# Patient Record
Sex: MALE | Race: WHITE | Employment: OTHER | URBAN - METROPOLITAN AREA
[De-identification: names, ages, dates, MRNs, and addresses within clinical notes are randomized per-mention and may not be internally consistent; named-entity substitution may affect disease eponyms.]

---

## 2017-03-06 ENCOUNTER — HOSPITAL ENCOUNTER (EMERGENCY)
Facility: HOSPITAL | Age: 72
Discharge: HOME | End: 2017-03-06
Attending: EMERGENCY MEDICINE
Payer: MEDICARE

## 2017-03-06 ENCOUNTER — APPOINTMENT (OUTPATIENT)
Dept: GENERAL RADIOLOGY | Facility: HOSPITAL | Age: 72
End: 2017-03-06
Attending: EMERGENCY MEDICINE
Payer: MEDICARE

## 2017-03-06 VITALS
WEIGHT: 200 LBS | HEART RATE: 70 BPM | BODY MASS INDEX: 31.39 KG/M2 | RESPIRATION RATE: 16 BRPM | SYSTOLIC BLOOD PRESSURE: 131 MMHG | TEMPERATURE: 98 F | OXYGEN SATURATION: 97 % | DIASTOLIC BLOOD PRESSURE: 78 MMHG | HEIGHT: 67 IN

## 2017-03-06 DIAGNOSIS — M54.40 BACK PAIN OF LUMBAR REGION WITH SCIATICA: Primary | ICD-10-CM

## 2017-03-06 PROCEDURE — 99283 EMERGENCY DEPT VISIT LOW MDM: CPT

## 2017-03-06 PROCEDURE — 72110 X-RAY EXAM L-2 SPINE 4/>VWS: CPT

## 2017-03-06 RX ORDER — TRAMADOL HYDROCHLORIDE 50 MG/1
50 TABLET ORAL ONCE
Status: COMPLETED | OUTPATIENT
Start: 2017-03-06 | End: 2017-03-06

## 2017-03-06 RX ORDER — TRAMADOL HYDROCHLORIDE 50 MG/1
50 TABLET ORAL EVERY 6 HOURS PRN
Qty: 20 TABLET | Refills: 0 | Status: SHIPPED | OUTPATIENT
Start: 2017-03-06 | End: 2017-03-13

## 2017-03-06 NOTE — ED PROVIDER NOTES
Patient Seen in: [facility] Emergency Department    History   Patient presents with:  Pain (neurologic)    Stated Complaint: right hip pain    HPI    Patient is a pleasant 27-year-old male, presenting for evaluation of right lower back pain.   Discom Family History   Problem Relation Age of Onset   • Heart Disorder Mother      CAD, Valve replacement         Smoking Status: Never Smoker                      Smokeless Status: Never Used                        Alcohol Use: Yes                Comment: shan intact and equal bilaterally. Antalgic gait. BACK:  No CVA or vertebral tenderness. Decreased range of motion.     ED Course     Labs Reviewed - No data to display  Xr Lumbar Spine (min 4 Views) (cpt=72110)    3/6/2017  PROCEDURE:  XR LUMBAR SPINE (MIN 4 reevaluation of any problems, worsening symptoms, or as discussed. Patient and family verbalized understanding and are comfortable with the plan as recommended. Patient was subsequently discharged without incident.     Disposition and Plan     [facility]

## 2017-03-06 NOTE — ED INITIAL ASSESSMENT (HPI)
Pt complaining of right hip pain that radiates down to right knee that started Saturday. Denies any previous injury or fall. Pain worst with ambulation. Relieved by lying flat. Today right leg gave out. unable to stand up for an hour, So pt called 911.  Rec

## 2017-03-17 PROBLEM — S39.012A LUMBAR STRAIN, INITIAL ENCOUNTER: Status: ACTIVE | Noted: 2017-03-17

## 2017-10-18 PROCEDURE — 81003 URINALYSIS AUTO W/O SCOPE: CPT | Performed by: FAMILY MEDICINE

## 2018-07-27 RX ORDER — DIPHENOXYLATE HYDROCHLORIDE AND ATROPINE SULFATE 2.5; .025 MG/1; MG/1
1 TABLET ORAL
COMMUNITY
End: 2018-07-27 | Stop reason: ALTCHOICE

## 2018-07-27 RX ORDER — FEXOFENADINE HCL 180 MG/1
180 TABLET ORAL
COMMUNITY
End: 2019-11-05

## 2018-07-27 RX ORDER — MULTIVITAMIN
1 TABLET ORAL DAILY
COMMUNITY

## 2018-07-27 RX ORDER — BENAZEPRIL HYDROCHLORIDE 20 MG/1
20 TABLET ORAL DAILY
COMMUNITY
End: 2018-12-19

## 2018-07-30 ENCOUNTER — HOSPITAL ENCOUNTER (OUTPATIENT)
Dept: INTERVENTIONAL RADIOLOGY/VASCULAR | Facility: HOSPITAL | Age: 73
Discharge: HOME | End: 2018-07-30
Attending: INTERNAL MEDICINE | Admitting: INTERNAL MEDICINE
Payer: MEDICARE

## 2018-07-30 VITALS
TEMPERATURE: 98 F | HEART RATE: 68 BPM | OXYGEN SATURATION: 100 % | SYSTOLIC BLOOD PRESSURE: 148 MMHG | BODY MASS INDEX: 30.31 KG/M2 | WEIGHT: 200 LBS | RESPIRATION RATE: 14 BRPM | DIASTOLIC BLOOD PRESSURE: 76 MMHG | HEIGHT: 68 IN

## 2018-07-30 DIAGNOSIS — R07.9 CHEST PAIN: ICD-10-CM

## 2018-07-30 PROCEDURE — B2111ZZ FLUOROSCOPY OF MULTIPLE CORONARY ARTERIES USING LOW OSMOLAR CONTRAST: ICD-10-PCS | Performed by: INTERNAL MEDICINE

## 2018-07-30 PROCEDURE — 99153 MOD SED SAME PHYS/QHP EA: CPT

## 2018-07-30 PROCEDURE — 99152 MOD SED SAME PHYS/QHP 5/>YRS: CPT

## 2018-07-30 PROCEDURE — 93454 CORONARY ARTERY ANGIO S&I: CPT

## 2018-07-30 RX ORDER — ASPIRIN 81 MG/1
324 TABLET, CHEWABLE ORAL ONCE
Status: DISCONTINUED | OUTPATIENT
Start: 2018-07-30 | End: 2018-07-30 | Stop reason: HOSPADM

## 2018-07-30 RX ORDER — LIDOCAINE HYDROCHLORIDE 10 MG/ML
INJECTION, SOLUTION EPIDURAL; INFILTRATION; INTRACAUDAL; PERINEURAL
Status: COMPLETED
Start: 2018-07-30 | End: 2018-07-30

## 2018-07-30 RX ORDER — MIDAZOLAM HYDROCHLORIDE 1 MG/ML
INJECTION INTRAMUSCULAR; INTRAVENOUS
Status: COMPLETED
Start: 2018-07-30 | End: 2018-07-30

## 2018-07-30 RX ORDER — HEPARIN SODIUM 5000 [USP'U]/ML
INJECTION, SOLUTION INTRAVENOUS; SUBCUTANEOUS
Status: COMPLETED
Start: 2018-07-30 | End: 2018-07-30

## 2018-07-30 RX ORDER — SODIUM CHLORIDE 9 MG/ML
INJECTION, SOLUTION INTRAVENOUS CONTINUOUS
Status: DISCONTINUED | OUTPATIENT
Start: 2018-07-30 | End: 2018-07-30

## 2018-07-30 RX ORDER — CLOPIDOGREL BISULFATE 75 MG/1
75 TABLET ORAL ONCE
Status: COMPLETED | OUTPATIENT
Start: 2018-07-30 | End: 2018-07-30

## 2018-07-30 RX ORDER — CLOPIDOGREL BISULFATE 75 MG/1
TABLET ORAL
Status: COMPLETED
Start: 2018-07-30 | End: 2018-07-30

## 2018-07-30 RX ORDER — CLOPIDOGREL BISULFATE 75 MG/1
75 TABLET ORAL DAILY
Qty: 30 TABLET | Refills: 5 | Status: SHIPPED | OUTPATIENT
Start: 2018-07-30 | End: 2018-08-13

## 2018-07-30 RX ADMIN — CLOPIDOGREL BISULFATE 75 MG: 75 TABLET ORAL at 17:45:00

## 2018-07-30 RX ADMIN — SODIUM CHLORIDE 500 ML: 9 INJECTION, SOLUTION INTRAVENOUS at 15:40:00

## 2018-07-30 NOTE — H&P
Seen in holding. cardiolite was normal, but still with exertional concerns. Normal cardiolite prior to previous PCI. Cath is indicated. No change from office note.

## 2018-07-30 NOTE — PROGRESS NOTES
Assuming care of patient at 1600, s/p C, right groin soft c/d/i, patient denies pain 0/10 on pain scale. Patient tolerating PO intake. Bedrest completed, voiding without complaints, ambulating in halls without complaints.  Dr. Neetu Dodd here at bedside speakin

## 2018-07-31 NOTE — PROCEDURES
[facility]    PATIENT'S NAME: Evgeny WETZEL   ATTENDING PHYSICIAN: Carmen Mohamud M.D. OPERATING PHYSICIAN: Carmen Mohamud M.D.    PATIENT ACCOUNT#:   [de-identified]    LOCATION:  [place] [address] ED  MEDICAL RECORD #:   [id number] in-stent restenoses. There is diffuse disease within the right coronary artery as well as diffuse calcification. The disease is nonobstructive, however.   There is a 50% to 60% stenosis distally before the takeoff of the posterior descending coronary nav

## 2018-08-02 ENCOUNTER — HOSPITAL ENCOUNTER (OUTPATIENT)
Dept: ULTRASOUND IMAGING | Facility: HOSPITAL | Age: 73
Discharge: HOME | End: 2018-08-02
Attending: NURSE PRACTITIONER
Payer: MEDICARE

## 2018-08-02 DIAGNOSIS — T14.8XXA HEMATOMA: ICD-10-CM

## 2018-08-02 DIAGNOSIS — Z95.820 S/P ANGIOPLASTY WITH STENT: ICD-10-CM

## 2018-08-02 PROCEDURE — 93926 LOWER EXTREMITY STUDY: CPT | Performed by: NURSE PRACTITIONER

## 2018-08-02 NOTE — H&P
Seen 6/26/18 in clinic. As below. Has been hiking and active around the house. He is back because he has exertional chest pain only after eating. Stable over the past 6 months.     Patient denies palpitations, light headedness, edema, or dyspnea out of

## 2018-08-02 NOTE — PROGRESS NOTES
No active bleeding, pseudoaneurysm or DVT noted. Discussed results with patient. Given instructions to go to ER should the hematoma change in size or active bleeding noted. He verbalized understanding.

## 2018-08-07 ENCOUNTER — HOSPITAL ENCOUNTER (OUTPATIENT)
Dept: INTERVENTIONAL RADIOLOGY/VASCULAR | Facility: HOSPITAL | Age: 73
Discharge: HOME | End: 2018-08-08
Attending: INTERNAL MEDICINE | Admitting: INTERNAL MEDICINE
Payer: MEDICARE

## 2018-08-07 DIAGNOSIS — I25.10 CAD (CORONARY ARTERY DISEASE): ICD-10-CM

## 2018-08-07 PROCEDURE — 93010 ELECTROCARDIOGRAM REPORT: CPT | Performed by: INTERNAL MEDICINE

## 2018-08-07 PROCEDURE — 027034Z DILATION OF CORONARY ARTERY, ONE ARTERY WITH DRUG-ELUTING INTRALUMINAL DEVICE, PERCUTANEOUS APPROACH: ICD-10-PCS | Performed by: INTERNAL MEDICINE

## 2018-08-07 PROCEDURE — 85347 COAGULATION TIME ACTIVATED: CPT

## 2018-08-07 PROCEDURE — 99153 MOD SED SAME PHYS/QHP EA: CPT

## 2018-08-07 PROCEDURE — 02C03ZZ EXTIRPATION OF MATTER FROM CORONARY ARTERY, ONE ARTERY, PERCUTANEOUS APPROACH: ICD-10-PCS | Performed by: INTERNAL MEDICINE

## 2018-08-07 PROCEDURE — 99152 MOD SED SAME PHYS/QHP 5/>YRS: CPT

## 2018-08-07 PROCEDURE — 93005 ELECTROCARDIOGRAM TRACING: CPT

## 2018-08-07 RX ORDER — SODIUM CHLORIDE 9 MG/ML
INJECTION, SOLUTION INTRAVENOUS CONTINUOUS
Status: ACTIVE | OUTPATIENT
Start: 2018-08-07 | End: 2018-08-07

## 2018-08-07 RX ORDER — CLOPIDOGREL BISULFATE 75 MG/1
75 TABLET ORAL DAILY
Status: DISCONTINUED | OUTPATIENT
Start: 2018-08-08 | End: 2018-08-08

## 2018-08-07 RX ORDER — HEPARIN SODIUM 5000 [USP'U]/ML
INJECTION, SOLUTION INTRAVENOUS; SUBCUTANEOUS
Status: COMPLETED
Start: 2018-08-07 | End: 2018-08-07

## 2018-08-07 RX ORDER — MIDAZOLAM HYDROCHLORIDE 1 MG/ML
INJECTION INTRAMUSCULAR; INTRAVENOUS
Status: COMPLETED
Start: 2018-08-07 | End: 2018-08-07

## 2018-08-07 RX ORDER — ASPIRIN 81 MG/1
324 TABLET, CHEWABLE ORAL ONCE
Status: DISCONTINUED | OUTPATIENT
Start: 2018-08-07 | End: 2018-08-07

## 2018-08-07 RX ORDER — LIDOCAINE HYDROCHLORIDE 10 MG/ML
INJECTION, SOLUTION EPIDURAL; INFILTRATION; INTRACAUDAL; PERINEURAL
Status: COMPLETED
Start: 2018-08-07 | End: 2018-08-07

## 2018-08-07 RX ORDER — SODIUM CHLORIDE 9 MG/ML
INJECTION, SOLUTION INTRAVENOUS CONTINUOUS
Status: DISCONTINUED | OUTPATIENT
Start: 2018-08-07 | End: 2018-08-08

## 2018-08-07 RX ADMIN — SODIUM CHLORIDE: 9 INJECTION, SOLUTION INTRAVENOUS at 12:30:00

## 2018-08-07 RX ADMIN — SODIUM CHLORIDE: 9 INJECTION, SOLUTION INTRAVENOUS at 18:00:00

## 2018-08-07 NOTE — H&P
Cards    See EMR. Diagnostic cath by Dr. Wellington Stephens. Symptoms are stable. Planned PCI, possible laser atherectomy.     Patient Active Problem List:     S/P angioplasty with stent     Dyslipidemia     HTN (hypertension)    Past Medical History:   Liss

## 2018-08-07 NOTE — PROGRESS NOTES
Cath:    PCI to CX: 7F. Microkit. Left femoral.  EBU 3.75 guide. BMW in CX. Prowater in LAD. 2.5mm balloon to predilate. Laser 0.9mm. Several passes at moderate energy. 2.5mm angiosculpt and then 3.0mm NC. Somewhat \"hazy\" at proximal CX.   Stente

## 2018-08-07 NOTE — PLAN OF CARE
Assumed care of patient around [id number]. Pt received from cath lab. A/o x 4, RA, NSR on tele monitor. Left groin incision soft, no hematoma. Dressing clean/dry/intact. Right groin bruising noted from previous angiogram. BR per protocol. IVF infusing per order.

## 2018-08-08 VITALS
HEART RATE: 110 BPM | WEIGHT: 200 LBS | TEMPERATURE: 98 F | RESPIRATION RATE: 18 BRPM | SYSTOLIC BLOOD PRESSURE: 148 MMHG | DIASTOLIC BLOOD PRESSURE: 85 MMHG | HEIGHT: 68 IN | OXYGEN SATURATION: 96 % | BODY MASS INDEX: 30.31 KG/M2

## 2018-08-08 LAB
ANION GAP SERPL CALC-SCNC: 9 MMOL/L (ref 0–18)
ATRIAL RATE: 65 BPM
BUN BLD-MCNC: 15 MG/DL (ref 8–20)
BUN/CREAT SERPL: 14 (ref 10–20)
CALCIUM BLD-MCNC: 8.9 MG/DL (ref 8.3–10.3)
CHLORIDE SERPL-SCNC: 105 MMOL/L (ref 101–111)
CO2 SERPL-SCNC: 26 MMOL/L (ref 22–32)
CREAT BLD-MCNC: 1.07 MG/DL (ref 0.7–1.3)
ERYTHROCYTE [DISTWIDTH] IN BLOOD BY AUTOMATED COUNT: 13.2 % (ref 11.5–16)
GLUCOSE BLD-MCNC: 91 MG/DL (ref 70–99)
HCT VFR BLD AUTO: 39.1 % (ref 37–53)
HGB BLD-MCNC: 13.7 G/DL (ref 13–17)
ISTAT ACTIVATED CLOTTING TIME: 367 SECONDS (ref 74–137)
MCH RBC QN AUTO: 32.5 PG (ref 27–33.2)
MCHC RBC AUTO-ENTMCNC: 35 G/DL (ref 31–37)
MCV RBC AUTO: 92.9 FL (ref 80–99)
OSMOLALITY SERPL CALC.SUM OF ELEC: 290 MOSM/KG (ref 275–295)
P AXIS: 59 DEGREES
P-R INTERVAL: 144 MS
PLATELET # BLD AUTO: 147 10(3)UL (ref 150–450)
POTASSIUM SERPL-SCNC: 4.1 MMOL/L (ref 3.6–5.1)
Q-T INTERVAL: 442 MS
QRS DURATION: 126 MS
QTC CALCULATION (BEZET): 459 MS
R AXIS: 30 DEGREES
RBC # BLD AUTO: 4.21 X10(6)UL (ref 3.8–5.8)
RED CELL DISTRIBUTION WIDTH-SD: 44.6 FL (ref 35.1–46.3)
SODIUM SERPL-SCNC: 140 MMOL/L (ref 136–144)
T AXIS: 7 DEGREES
VENTRICULAR RATE: 65 BPM
WBC # BLD AUTO: 7.2 X10(3) UL (ref 4–13)

## 2018-08-08 PROCEDURE — 85027 COMPLETE CBC AUTOMATED: CPT | Performed by: INTERNAL MEDICINE

## 2018-08-08 PROCEDURE — 80048 BASIC METABOLIC PNL TOTAL CA: CPT | Performed by: INTERNAL MEDICINE

## 2018-08-08 RX ADMIN — CLOPIDOGREL BISULFATE 75 MG: 75 TABLET ORAL at 08:19:00

## 2018-08-08 NOTE — CARDIAC REHAB
Cardiac rehab education for CAD/Stent completed with pt and spouse. Stent card and copy given to patient. Phase 2 information provided, but pt declined at this time.

## 2018-08-08 NOTE — PLAN OF CARE
Patient is alert and oriented. On room air, NSR on the tele. Right groin is bruised from previous cath. Left groin is clean, dry and intact. Soft to the touch. Denies any pain or SOB. Patient ambulates ad mag. Plan for patient to be discharged today.  POC up

## 2018-08-08 NOTE — PLAN OF CARE
Patient is able to discharge. IV was discontinued, and tele was taken off and returned. Discharge papers explained. All questions answered. Patient and wife verbalized understanding. Patient was escorted via wheel chair to the lobby.

## 2018-08-08 NOTE — PROCEDURES
[address]    PATIENT'S NAME: Tarik Berto.OSMAR   ATTENDING PHYSICIAN: Sheryl Cote. Devora Franklin MD   OPERATING PHYSICIAN: Sheryl Cote.  Devora Franklin MD   PATIENT ACCOUNT#:   [id number]    LOCATION:  [address] [place] [address]  MEDICAL RECORD #:   [id number]       DATE OF BIRTH: from [phone] until [phone] under my direct supervision. CONCLUSIONS:  A 82-year-old gentleman with CAD and 90% circumflex lesion. This was \"in segment\" restenosis. We performed laser atherectomy and stenting. We had an excellent result.   We will observe

## 2018-08-08 NOTE — PLAN OF CARE
CARDIOVASCULAR - ADULT    • Maintains optimal cardiac output and hemodynamic stability Progressing    • Absence of cardiac arrhythmias or at baseline Progressing        SKIN/TISSUE INTEGRITY - ADULT    • Skin integrity remains intact Progressing          R

## 2018-08-08 NOTE — DIETARY NOTE
Nutrition Short Note    Dietitian consult received per cardiac rehab/CHF protocol. Pt to be educated by cardiac rehab staff and encouraged to attend outpatient classes taught by [employer]. [employer] available PRN.     Sushma Castellanos RD, LDN  Pager #[phone]

## 2018-08-08 NOTE — PROGRESS NOTES
Cards    FU: CAD, PCI    No CP. No LINDO. Ambulating. No groin issues.     Past Medical History:   Diagnosis Date   • CAD (coronary artery disease)    • Coronary atherosclerosis    • High blood pressure    • HIGH CHOLESTEROL    • HYPERTENSION

## 2019-01-11 ENCOUNTER — HOSPITAL ENCOUNTER (OUTPATIENT)
Dept: INTERVENTIONAL RADIOLOGY/VASCULAR | Facility: HOSPITAL | Age: 74
Discharge: HOME | End: 2019-01-12
Attending: INTERNAL MEDICINE | Admitting: INTERNAL MEDICINE
Payer: MEDICARE

## 2019-01-11 DIAGNOSIS — I25.10 CAD (CORONARY ARTERY DISEASE): ICD-10-CM

## 2019-01-11 DIAGNOSIS — I20.0 UNSTABLE ANGINA (HCC): ICD-10-CM

## 2019-01-11 LAB
ATRIAL RATE: 71 BPM
ATRIAL RATE: 72 BPM
ERYTHROCYTE [DISTWIDTH] IN BLOOD BY AUTOMATED COUNT: 13.2 % (ref 11.5–16)
HCT VFR BLD AUTO: 46.4 % (ref 37–53)
HGB BLD-MCNC: 16.2 G/DL (ref 13–17)
MCH RBC QN AUTO: 32.5 PG (ref 27–33.2)
MCHC RBC AUTO-ENTMCNC: 34.9 G/DL (ref 31–37)
MCV RBC AUTO: 93 FL (ref 80–99)
P AXIS: 54 DEGREES
P AXIS: 56 DEGREES
P-R INTERVAL: 138 MS
P-R INTERVAL: 140 MS
PLATELET # BLD AUTO: 143 10(3)UL (ref 150–450)
Q-T INTERVAL: 418 MS
Q-T INTERVAL: 422 MS
QRS DURATION: 126 MS
QRS DURATION: 128 MS
QTC CALCULATION (BEZET): 457 MS
QTC CALCULATION (BEZET): 458 MS
R AXIS: 29 DEGREES
R AXIS: 38 DEGREES
RBC # BLD AUTO: 4.99 X10(6)UL (ref 3.8–5.8)
RED CELL DISTRIBUTION WIDTH-SD: 45 FL (ref 35.1–46.3)
T AXIS: 15 DEGREES
T AXIS: 18 DEGREES
VENTRICULAR RATE: 71 BPM
VENTRICULAR RATE: 72 BPM
WBC # BLD AUTO: 5.6 X10(3) UL (ref 4–13)

## 2019-01-11 PROCEDURE — 93454 CORONARY ARTERY ANGIO S&I: CPT

## 2019-01-11 PROCEDURE — 93010 ELECTROCARDIOGRAM REPORT: CPT | Performed by: INTERNAL MEDICINE

## 2019-01-11 PROCEDURE — 99153 MOD SED SAME PHYS/QHP EA: CPT

## 2019-01-11 PROCEDURE — 99152 MOD SED SAME PHYS/QHP 5/>YRS: CPT

## 2019-01-11 PROCEDURE — 93005 ELECTROCARDIOGRAM TRACING: CPT

## 2019-01-11 PROCEDURE — 92920 PRQ TRLUML C ANGIOP 1ART&/BR: CPT

## 2019-01-11 PROCEDURE — 85027 COMPLETE CBC AUTOMATED: CPT | Performed by: INTERNAL MEDICINE

## 2019-01-11 PROCEDURE — 02703ZZ DILATION OF CORONARY ARTERY, ONE ARTERY, PERCUTANEOUS APPROACH: ICD-10-PCS | Performed by: INTERNAL MEDICINE

## 2019-01-11 PROCEDURE — 36415 COLL VENOUS BLD VENIPUNCTURE: CPT

## 2019-01-11 PROCEDURE — B2111ZZ FLUOROSCOPY OF MULTIPLE CORONARY ARTERIES USING LOW OSMOLAR CONTRAST: ICD-10-PCS | Performed by: INTERNAL MEDICINE

## 2019-01-11 PROCEDURE — 3E033PZ INTRODUCTION OF PLATELET INHIBITOR INTO PERIPHERAL VEIN, PERCUTANEOUS APPROACH: ICD-10-PCS | Performed by: INTERNAL MEDICINE

## 2019-01-11 RX ORDER — SODIUM CHLORIDE 9 MG/ML
INJECTION, SOLUTION INTRAVENOUS CONTINUOUS
Status: DISCONTINUED | OUTPATIENT
Start: 2019-01-11 | End: 2019-01-12

## 2019-01-11 RX ORDER — ACETAMINOPHEN 325 MG/1
325 TABLET ORAL EVERY 4 HOURS PRN
Status: DISCONTINUED | OUTPATIENT
Start: 2019-01-11 | End: 2019-01-12

## 2019-01-11 RX ORDER — ASPIRIN 81 MG/1
324 TABLET, CHEWABLE ORAL ONCE
Status: DISCONTINUED | OUTPATIENT
Start: 2019-01-11 | End: 2019-01-11 | Stop reason: HOSPADM

## 2019-01-11 RX ORDER — MIDAZOLAM HYDROCHLORIDE 1 MG/ML
INJECTION INTRAMUSCULAR; INTRAVENOUS
Status: COMPLETED
Start: 2019-01-11 | End: 2019-01-11

## 2019-01-11 RX ORDER — LISINOPRIL 20 MG/1
20 TABLET ORAL DAILY
Status: DISCONTINUED | OUTPATIENT
Start: 2019-01-12 | End: 2019-01-12

## 2019-01-11 RX ORDER — DOXEPIN HYDROCHLORIDE 50 MG/1
1 CAPSULE ORAL DAILY
Status: DISCONTINUED | OUTPATIENT
Start: 2019-01-11 | End: 2019-01-12

## 2019-01-11 RX ORDER — CLOPIDOGREL BISULFATE 75 MG/1
75 TABLET ORAL DAILY
Status: DISCONTINUED | OUTPATIENT
Start: 2019-01-12 | End: 2019-01-11

## 2019-01-11 RX ORDER — CETIRIZINE HYDROCHLORIDE 10 MG/1
10 TABLET ORAL DAILY
Status: DISCONTINUED | OUTPATIENT
Start: 2019-01-12 | End: 2019-01-12

## 2019-01-11 RX ORDER — ASPIRIN 81 MG/1
81 TABLET ORAL NIGHTLY
Status: DISCONTINUED | OUTPATIENT
Start: 2019-01-11 | End: 2019-01-12

## 2019-01-11 RX ORDER — SODIUM CHLORIDE 9 MG/ML
INJECTION, SOLUTION INTRAVENOUS CONTINUOUS
Status: ACTIVE | OUTPATIENT
Start: 2019-01-11 | End: 2019-01-12

## 2019-01-11 RX ORDER — ACETAMINOPHEN 325 MG/1
650 TABLET ORAL EVERY 4 HOURS PRN
Status: DISCONTINUED | OUTPATIENT
Start: 2019-01-11 | End: 2019-01-12

## 2019-01-11 RX ORDER — MIDAZOLAM HYDROCHLORIDE 1 MG/ML
INJECTION INTRAMUSCULAR; INTRAVENOUS
Status: DISCONTINUED
Start: 2019-01-11 | End: 2019-01-11 | Stop reason: WASHOUT

## 2019-01-11 RX ORDER — CLOPIDOGREL BISULFATE 75 MG/1
75 TABLET ORAL DAILY
Status: DISCONTINUED | OUTPATIENT
Start: 2019-01-12 | End: 2019-01-12

## 2019-01-11 RX ADMIN — SODIUM CHLORIDE: 9 INJECTION, SOLUTION INTRAVENOUS at 18:57:00

## 2019-01-11 RX ADMIN — SODIUM CHLORIDE: 9 INJECTION, SOLUTION INTRAVENOUS at 17:25:00

## 2019-01-11 RX ADMIN — ASPIRIN 81 MG: 81 TABLET ORAL at 20:32:00

## 2019-01-11 RX ADMIN — ACETAMINOPHEN 650 MG: 325 TABLET ORAL at 14:00:00

## 2019-01-11 RX ADMIN — SODIUM CHLORIDE: 9 INJECTION, SOLUTION INTRAVENOUS at 07:30:00

## 2019-01-11 NOTE — PROCEDURES
[facility] Cardiology      Procedure:   CORONARY ANGIO, PTCA PROX LCX                         PERCLOSE RFA      Findings        LM: PLAQUE    LCx: 99% PROX, IN-STENT    LAD: PLAQUE    RCA: 50-60% MID    RESULTS: PTCA PROX LCX, 99% TO 15% WITH 3.5 X 12 MM NC BAL

## 2019-01-11 NOTE — PROCEDURES
[address]    PATIENT'S NAME: Ruma Signs E   ATTENDING PHYSICIAN: Surendra Cristina M.D. OPERATING PHYSICIAN: Surendra Cristina M.D.    PATIENT ACCOUNT#:   [de-identified]    LOCATION:  [address] [place] [address]  MEDICAL RECORD #:   [id number] septal  branch. In the mid LAD, just beyond the large septal  branch, the LAD itself is seen to have narrowing of about 50%. The right coronary artery is a medium-sized anatomically dominant vessel.   It gives rise to a very small PDA, Charley Wood M.D.  d: 01/11/2019 12:22:39  t: 01/11/2019 12:32:27  [facility] [id number]  Z/

## 2019-01-11 NOTE — PROGRESS NOTES
Pt here for angiogram. Labs and EKG were done yesterday at [facility] office. Only BMP was found. Per Dr Ayesha Brizuela, he does not have results and tests need to be repeated.

## 2019-01-11 NOTE — DIETARY NOTE
Nutrition Short Note    Dietitian consult received per cardiac rehab/CHF protocol. Pt to be educated by cardiac rehab staff and encouraged to attend outpatient classes taught by RD. RD available PRN.     Scott Martinez RD, LDN  Pager #[phone]

## 2019-01-12 VITALS
DIASTOLIC BLOOD PRESSURE: 86 MMHG | TEMPERATURE: 99 F | HEART RATE: 93 BPM | OXYGEN SATURATION: 97 % | BODY MASS INDEX: 31.39 KG/M2 | HEIGHT: 67 IN | SYSTOLIC BLOOD PRESSURE: 132 MMHG | RESPIRATION RATE: 18 BRPM | WEIGHT: 200 LBS

## 2019-01-12 LAB
ANION GAP SERPL CALC-SCNC: 12 MMOL/L (ref 0–18)
BASOPHILS # BLD AUTO: 0.03 X10(3) UL (ref 0–0.1)
BASOPHILS NFR BLD AUTO: 0.4 %
BUN BLD-MCNC: 17 MG/DL (ref 8–20)
BUN/CREAT SERPL: 16.5 (ref 10–20)
CALCIUM BLD-MCNC: 8.9 MG/DL (ref 8.3–10.3)
CHLORIDE SERPL-SCNC: 107 MMOL/L (ref 101–111)
CO2 SERPL-SCNC: 21 MMOL/L (ref 22–32)
CREAT BLD-MCNC: 1.03 MG/DL (ref 0.7–1.3)
EOSINOPHIL # BLD AUTO: 0.12 X10(3) UL (ref 0–0.3)
EOSINOPHIL NFR BLD AUTO: 1.6 %
ERYTHROCYTE [DISTWIDTH] IN BLOOD BY AUTOMATED COUNT: 13 % (ref 11.5–16)
GLUCOSE BLD-MCNC: 92 MG/DL (ref 70–99)
HCT VFR BLD AUTO: 43.3 % (ref 37–53)
HGB BLD-MCNC: 15 G/DL (ref 13–17)
IMMATURE GRANULOCYTE COUNT: 0.02 X10(3) UL (ref 0–1)
IMMATURE GRANULOCYTE RATIO %: 0.3 %
LYMPHOCYTES # BLD AUTO: 1.43 X10(3) UL (ref 0.9–4)
LYMPHOCYTES NFR BLD AUTO: 18.5 %
MCH RBC QN AUTO: 32.3 PG (ref 27–33.2)
MCHC RBC AUTO-ENTMCNC: 34.6 G/DL (ref 31–37)
MCV RBC AUTO: 93.1 FL (ref 80–99)
MONOCYTES # BLD AUTO: 0.62 X10(3) UL (ref 0.1–1)
MONOCYTES NFR BLD AUTO: 8 %
NEUTROPHIL ABS PRELIM: 5.5 X10 (3) UL (ref 1.3–6.7)
NEUTROPHILS # BLD AUTO: 5.5 X10(3) UL (ref 1.3–6.7)
NEUTROPHILS NFR BLD AUTO: 71.2 %
OSMOLALITY SERPL CALC.SUM OF ELEC: 291 MOSM/KG (ref 275–295)
PLATELET # BLD AUTO: 134 10(3)UL (ref 150–450)
POTASSIUM SERPL-SCNC: 4.2 MMOL/L (ref 3.6–5.1)
RBC # BLD AUTO: 4.65 X10(6)UL (ref 3.8–5.8)
RED CELL DISTRIBUTION WIDTH-SD: 44.4 FL (ref 35.1–46.3)
SODIUM SERPL-SCNC: 140 MMOL/L (ref 136–144)
WBC # BLD AUTO: 7.7 X10(3) UL (ref 4–13)

## 2019-01-12 PROCEDURE — 80048 BASIC METABOLIC PNL TOTAL CA: CPT | Performed by: INTERNAL MEDICINE

## 2019-01-12 PROCEDURE — 85025 COMPLETE CBC W/AUTO DIFF WBC: CPT | Performed by: INTERNAL MEDICINE

## 2019-01-12 RX ORDER — ROSUVASTATIN CALCIUM 5 MG/1
5 TABLET, COATED ORAL ONCE
Qty: 90 TABLET | Refills: 3 | Status: SHIPPED | OUTPATIENT
Start: 2019-01-12 | End: 2019-01-12

## 2019-01-12 RX ORDER — ROSUVASTATIN CALCIUM 5 MG/1
5 TABLET, COATED ORAL ONCE
Status: COMPLETED | OUTPATIENT
Start: 2019-01-12 | End: 2019-01-12

## 2019-01-12 RX ADMIN — ROSUVASTATIN CALCIUM 5 MG: 5 TABLET, COATED ORAL at 09:08:00

## 2019-01-12 RX ADMIN — CLOPIDOGREL BISULFATE 75 MG: 75 TABLET ORAL at 08:55:00

## 2019-01-12 NOTE — PROGRESS NOTES
Discussed importance of cardiac rehab education and physically going to class. Pt. Refuses. He states \"I have a stationary bike at home and I'd rather do walks around the neighborhood\".

## 2019-01-12 NOTE — PROGRESS NOTES
Assumed pt care at 0200. AOx4, no complaints of chest pain or SOB, right groin stable, dressing C/D/I, no signs of oozing or hematoma, VSS, NSR on tele, safety precautions in place; bed in lowest position, call light within reach, ant-slip socks placed.  Wi

## 2019-01-12 NOTE — PROGRESS NOTES
[facility] Cardiology Progress Note        Andrew Bergman.  Patient Status:  Outpatient in a Bed    1945 MRN [id number]   [facility] 8NE-A Attending Zaid Newer Day # 0 PCP KAILEE Mustafa hours.    Invalid input(s): ALPHOS, TBIL, DBIL, TPROT    No results for input(s): TROP, CK in the last 168 hours. No results for input(s): PTP, INR in the last 168 hours. Impression:  1.  CAD- S/P angioplasty and stent of proximal left circ

## 2019-01-12 NOTE — PLAN OF CARE
Denies c/o pain, malaise, or cardiac symptoms. Often in ST on telemetry sometimes as high as 120s. Ambulating the halls well with no concerns for his right groin. Old drainage, but otherwise free from hematoma, bleeding, or oozing. Lungs clear on RA.

## 2019-02-18 PROCEDURE — 81003 URINALYSIS AUTO W/O SCOPE: CPT | Performed by: FAMILY MEDICINE

## 2019-04-21 ENCOUNTER — APPOINTMENT (OUTPATIENT)
Dept: GENERAL RADIOLOGY | Facility: HOSPITAL | Age: 74
DRG: 234 | End: 2019-04-21
Payer: MEDICARE

## 2019-04-21 ENCOUNTER — HOSPITAL ENCOUNTER (INPATIENT)
Facility: HOSPITAL | Age: 74
LOS: 8 days | Discharge: HOME HEALTH | DRG: 234 | End: 2019-04-30
Admitting: INTERNAL MEDICINE
Payer: MEDICARE

## 2019-04-21 DIAGNOSIS — R07.9 ACUTE CHEST PAIN: Primary | ICD-10-CM

## 2019-04-21 PROCEDURE — 80053 COMPREHEN METABOLIC PANEL: CPT

## 2019-04-21 PROCEDURE — 93005 ELECTROCARDIOGRAM TRACING: CPT

## 2019-04-21 PROCEDURE — 84484 ASSAY OF TROPONIN QUANT: CPT

## 2019-04-21 PROCEDURE — 96374 THER/PROPH/DIAG INJ IV PUSH: CPT

## 2019-04-21 PROCEDURE — 71045 X-RAY EXAM CHEST 1 VIEW: CPT

## 2019-04-21 PROCEDURE — 85378 FIBRIN DEGRADE SEMIQUANT: CPT

## 2019-04-21 PROCEDURE — S0028 INJECTION, FAMOTIDINE, 20 MG: HCPCS

## 2019-04-21 PROCEDURE — 93010 ELECTROCARDIOGRAM REPORT: CPT

## 2019-04-21 PROCEDURE — 99285 EMERGENCY DEPT VISIT HI MDM: CPT

## 2019-04-21 PROCEDURE — 83690 ASSAY OF LIPASE: CPT

## 2019-04-21 PROCEDURE — 85730 THROMBOPLASTIN TIME PARTIAL: CPT

## 2019-04-21 PROCEDURE — 84484 ASSAY OF TROPONIN QUANT: CPT | Performed by: INTERNAL MEDICINE

## 2019-04-21 PROCEDURE — 85610 PROTHROMBIN TIME: CPT

## 2019-04-21 PROCEDURE — 93010 ELECTROCARDIOGRAM REPORT: CPT | Performed by: INTERNAL MEDICINE

## 2019-04-21 PROCEDURE — 85025 COMPLETE CBC W/AUTO DIFF WBC: CPT

## 2019-04-21 PROCEDURE — 96375 TX/PRO/DX INJ NEW DRUG ADDON: CPT

## 2019-04-21 RX ORDER — METOPROLOL TARTRATE 5 MG/5ML
5 INJECTION INTRAVENOUS ONCE
Status: COMPLETED | OUTPATIENT
Start: 2019-04-21 | End: 2019-04-21

## 2019-04-21 RX ORDER — MORPHINE SULFATE 4 MG/ML
2 INJECTION, SOLUTION INTRAMUSCULAR; INTRAVENOUS EVERY 2 HOUR PRN
Status: DISCONTINUED | OUTPATIENT
Start: 2019-04-21 | End: 2019-04-25

## 2019-04-21 RX ORDER — ONDANSETRON 2 MG/ML
4 INJECTION INTRAMUSCULAR; INTRAVENOUS EVERY 6 HOURS PRN
Status: DISCONTINUED | OUTPATIENT
Start: 2019-04-21 | End: 2019-04-25

## 2019-04-21 RX ORDER — LISINOPRIL 20 MG/1
20 TABLET ORAL DAILY
Status: DISCONTINUED | OUTPATIENT
Start: 2019-04-21 | End: 2019-04-25

## 2019-04-21 RX ORDER — NITROGLYCERIN 20 MG/100ML
10 INJECTION INTRAVENOUS CONTINUOUS
Status: DISCONTINUED | OUTPATIENT
Start: 2019-04-21 | End: 2019-04-22

## 2019-04-21 RX ORDER — ASPIRIN 81 MG/1
324 TABLET, CHEWABLE ORAL ONCE
Status: COMPLETED | OUTPATIENT
Start: 2019-04-21 | End: 2019-04-21

## 2019-04-21 RX ORDER — LORAZEPAM 2 MG/ML
0.5 INJECTION INTRAMUSCULAR EVERY 4 HOURS PRN
Status: DISCONTINUED | OUTPATIENT
Start: 2019-04-21 | End: 2019-04-25

## 2019-04-21 RX ORDER — FAMOTIDINE 10 MG/ML
20 INJECTION, SOLUTION INTRAVENOUS ONCE
Status: COMPLETED | OUTPATIENT
Start: 2019-04-21 | End: 2019-04-21

## 2019-04-21 RX ORDER — NITROGLYCERIN 0.4 MG/1
0.4 TABLET SUBLINGUAL EVERY 5 MIN PRN
Status: DISCONTINUED | OUTPATIENT
Start: 2019-04-21 | End: 2019-04-25

## 2019-04-21 RX ORDER — SODIUM CHLORIDE 9 MG/ML
INJECTION, SOLUTION INTRAVENOUS CONTINUOUS
Status: DISCONTINUED | OUTPATIENT
Start: 2019-04-21 | End: 2019-04-21

## 2019-04-21 RX ORDER — ENOXAPARIN SODIUM 100 MG/ML
90 INJECTION SUBCUTANEOUS ONCE
Status: COMPLETED | OUTPATIENT
Start: 2019-04-21 | End: 2019-04-21

## 2019-04-21 RX ORDER — ROSUVASTATIN CALCIUM 10 MG/1
5 TABLET, COATED ORAL NIGHTLY
Status: DISCONTINUED | OUTPATIENT
Start: 2019-04-21 | End: 2019-04-21

## 2019-04-21 RX ORDER — METOCLOPRAMIDE HYDROCHLORIDE 5 MG/ML
10 INJECTION INTRAMUSCULAR; INTRAVENOUS EVERY 8 HOURS PRN
Status: DISCONTINUED | OUTPATIENT
Start: 2019-04-21 | End: 2019-04-25

## 2019-04-21 RX ORDER — CLOPIDOGREL BISULFATE 75 MG/1
75 TABLET ORAL DAILY
Status: DISCONTINUED | OUTPATIENT
Start: 2019-04-21 | End: 2019-04-22

## 2019-04-21 RX ORDER — MORPHINE SULFATE 4 MG/ML
2 INJECTION, SOLUTION INTRAMUSCULAR; INTRAVENOUS ONCE
Status: COMPLETED | OUTPATIENT
Start: 2019-04-21 | End: 2019-04-21

## 2019-04-21 RX ORDER — ACETAMINOPHEN 325 MG/1
650 TABLET ORAL EVERY 6 HOURS PRN
Status: DISCONTINUED | OUTPATIENT
Start: 2019-04-21 | End: 2019-04-25

## 2019-04-21 RX ORDER — ASPIRIN 81 MG/1
81 TABLET ORAL NIGHTLY
Status: DISCONTINUED | OUTPATIENT
Start: 2019-04-21 | End: 2019-04-30

## 2019-04-21 RX ORDER — MORPHINE SULFATE 4 MG/ML
1 INJECTION, SOLUTION INTRAMUSCULAR; INTRAVENOUS EVERY 2 HOUR PRN
Status: DISCONTINUED | OUTPATIENT
Start: 2019-04-21 | End: 2019-04-25

## 2019-04-21 RX ORDER — MORPHINE SULFATE 4 MG/ML
0.5 INJECTION, SOLUTION INTRAMUSCULAR; INTRAVENOUS EVERY 2 HOUR PRN
Status: DISCONTINUED | OUTPATIENT
Start: 2019-04-21 | End: 2019-04-25

## 2019-04-21 NOTE — PROGRESS NOTES
Notified cardiology regarding increase frequency and intensity of chest pain. Orders received. Patient anxious about chest pain and not going to cath lab today. Reassured him that his EKG does not indicate need.  Notified hospitalist and orders received

## 2019-04-21 NOTE — PLAN OF CARE
Patient is alert and oriented x4. Bilateral breath sounds are clear. Room air. Cardiac rhythm is NSR. Pedal pulses palpable. No edema. Patient reports last BM on 4/20/19. Urinating without difficulty. POC:  Pending STAT troponin.  VO/RB per Dr. Lb Medrano

## 2019-04-21 NOTE — H&P
[facility] Hospitalist H&P       CC: Patient presents with:  Chest Pain Angina (cardiovascular)       PCP: Cookie Reid DO    History of Present Illness:  Mr. Hallie Chatterjee is a 71 yo male with PMH of CAD s/p prior PCI (8/7/18) and POBA (1/11/19), HTN, HLD, migraine Disp: 90 tablet Rfl: 3   Multiple Vitamin (ONE-DAILY MULTI VITAMINS) Oral Tab Take 1 tablet by mouth daily. Disp:  Rfl:    Aspirin (SB LOW DOSE ASA EC) 81 MG Oral Tab EC Take 81 mg by mouth nightly.    Disp:  Rfl:          Soc Hx  Social History    Tobacco ECG: sinus rhythm, RBBB    CXR: image personally reviewed No consolidation    Radiology: No results found.          ASSESSMENT / PLAN:     Mr. Anya Whitfield is a 71 yo male with PMH of CAD s/p prior PCI (8/7/18) and POBA (1/11/19), HTN, HLD, migraines who presente

## 2019-04-21 NOTE — CONSULTS
[facility] Cardiology Consultation    Alma Arias Patient Status:  Observation    1945 MRN [id number]   [facility] 8NE-A Attending Viviana Luque MD   Hosp Day # 0 PCP Ambrocio Bourgeois DO     Reason for Consultation:  Chest pain, CAD PTCA-Circumflex.  2011 and 2018   • NASAL SEPTOPLASTY WITH SUBMUCOUS RESECTION INFERIOR Bilateral 4/1/2009    Performed by Emy Ding MD at [facility]   • NUC STRESS TEST, CARDIO ([facility])  2/11    normal   • OTHER SURGICAL HISTOR BUN 13   CREATSERUM 1.24   CA 9.2   GLU 96       Recent Labs   Lab 04/21/19  0330   ALT 83*   AST 35   ALB 4.1       Recent Labs   Lab 04/21/19  0330   PTP 13.5   INR 0.99       Recent Labs   Lab 04/21/19 0330   TROP <0.045         Impression:   Exertio

## 2019-04-21 NOTE — ED PROVIDER NOTES
Patient Seen in: [facility] Emergency Department    History   Patient presents with:  Chest Pain Angina (cardiovascular)    Stated Complaint: substernal chest pain awoke him at 0215, denies SOB, Nausea, or radiation.     HPI    62-year-old with escal Alcohol use: Yes      Comment: rare cage questions answered    Drug use: No      Review of Systems    Positive for stated complaint: substernal chest pain awoke him at 0215, denies SOB, Nausea, or radiation. Other systems are as noted in HPI.   Yomi PROTHROMBIN TIME (PT) - Normal   PTT, ACTIVATED - Normal   D-DIMER - Normal    Narrative:     FEU = Fibrinogen Equivalent Units.     D-Dimer results of less than 0.5 ug/mL (FEU) have been shown to contribute to the exclusion of venous thromboembolism with specified.       Medications Prescribed:  Current Discharge Medication List        Present on Admission  Date Reviewed: 1/15/2019          ICD-10-CM Noted POA    Acute chest pain R07.9 4/21/2019 Unknown

## 2019-04-22 ENCOUNTER — APPOINTMENT (OUTPATIENT)
Dept: INTERVENTIONAL RADIOLOGY/VASCULAR | Facility: HOSPITAL | Age: 74
DRG: 234 | End: 2019-04-22
Attending: INTERNAL MEDICINE
Payer: MEDICARE

## 2019-04-22 ENCOUNTER — APPOINTMENT (OUTPATIENT)
Dept: ULTRASOUND IMAGING | Facility: HOSPITAL | Age: 74
DRG: 234 | End: 2019-04-22
Attending: PHYSICIAN ASSISTANT
Payer: MEDICARE

## 2019-04-22 PROCEDURE — 93005 ELECTROCARDIOGRAM TRACING: CPT

## 2019-04-22 PROCEDURE — 99152 MOD SED SAME PHYS/QHP 5/>YRS: CPT

## 2019-04-22 PROCEDURE — 93572 IV DOP VEL&/PRESS C FLO EA: CPT

## 2019-04-22 PROCEDURE — B2111ZZ FLUOROSCOPY OF MULTIPLE CORONARY ARTERIES USING LOW OSMOLAR CONTRAST: ICD-10-PCS | Performed by: INTERNAL MEDICINE

## 2019-04-22 PROCEDURE — 93458 L HRT ARTERY/VENTRICLE ANGIO: CPT

## 2019-04-22 PROCEDURE — 4A023N7 MEASUREMENT OF CARDIAC SAMPLING AND PRESSURE, LEFT HEART, PERCUTANEOUS APPROACH: ICD-10-PCS | Performed by: INTERNAL MEDICINE

## 2019-04-22 PROCEDURE — 84484 ASSAY OF TROPONIN QUANT: CPT | Performed by: INTERNAL MEDICINE

## 2019-04-22 PROCEDURE — 85025 COMPLETE CBC W/AUTO DIFF WBC: CPT | Performed by: INTERNAL MEDICINE

## 2019-04-22 PROCEDURE — 99153 MOD SED SAME PHYS/QHP EA: CPT

## 2019-04-22 PROCEDURE — 4A033BC MEASUREMENT OF ARTERIAL PRESSURE, CORONARY, PERCUTANEOUS APPROACH: ICD-10-PCS | Performed by: INTERNAL MEDICINE

## 2019-04-22 PROCEDURE — 85610 PROTHROMBIN TIME: CPT | Performed by: INTERNAL MEDICINE

## 2019-04-22 PROCEDURE — B2151ZZ FLUOROSCOPY OF LEFT HEART USING LOW OSMOLAR CONTRAST: ICD-10-PCS | Performed by: INTERNAL MEDICINE

## 2019-04-22 PROCEDURE — 85730 THROMBOPLASTIN TIME PARTIAL: CPT | Performed by: INTERNAL MEDICINE

## 2019-04-22 PROCEDURE — 80048 BASIC METABOLIC PNL TOTAL CA: CPT | Performed by: INTERNAL MEDICINE

## 2019-04-22 PROCEDURE — 93880 EXTRACRANIAL BILAT STUDY: CPT | Performed by: PHYSICIAN ASSISTANT

## 2019-04-22 PROCEDURE — 93010 ELECTROCARDIOGRAM REPORT: CPT | Performed by: INTERNAL MEDICINE

## 2019-04-22 PROCEDURE — 4A12XSH MONITORING OF CARDIAC VASCULAR PERFUSION USING INDOCYANINE GREEN DYE, EXTERNAL APPROACH: ICD-10-PCS | Performed by: INTERNAL MEDICINE

## 2019-04-22 PROCEDURE — 93571 IV DOP VEL&/PRESS C FLO 1ST: CPT

## 2019-04-22 PROCEDURE — 85576 BLOOD PLATELET AGGREGATION: CPT | Performed by: PHYSICIAN ASSISTANT

## 2019-04-22 RX ORDER — SODIUM CHLORIDE 9 MG/ML
INJECTION, SOLUTION INTRAVENOUS CONTINUOUS
Status: ACTIVE | OUTPATIENT
Start: 2019-04-22 | End: 2019-04-22

## 2019-04-22 RX ORDER — VERAPAMIL HYDROCHLORIDE 2.5 MG/ML
INJECTION, SOLUTION INTRAVENOUS
Status: COMPLETED
Start: 2019-04-22 | End: 2019-04-22

## 2019-04-22 RX ORDER — NITROGLYCERIN 20 MG/100ML
10 INJECTION INTRAVENOUS CONTINUOUS
Status: DISCONTINUED | OUTPATIENT
Start: 2019-04-22 | End: 2019-04-25

## 2019-04-22 RX ORDER — SODIUM CHLORIDE 9 MG/ML
INJECTION, SOLUTION INTRAVENOUS CONTINUOUS
Status: DISCONTINUED | OUTPATIENT
Start: 2019-04-22 | End: 2019-04-22

## 2019-04-22 RX ORDER — MIDAZOLAM HYDROCHLORIDE 1 MG/ML
INJECTION INTRAMUSCULAR; INTRAVENOUS
Status: COMPLETED
Start: 2019-04-22 | End: 2019-04-22

## 2019-04-22 RX ORDER — LIDOCAINE HYDROCHLORIDE 10 MG/ML
INJECTION, SOLUTION EPIDURAL; INFILTRATION; INTRACAUDAL; PERINEURAL
Status: COMPLETED
Start: 2019-04-22 | End: 2019-04-22

## 2019-04-22 RX ORDER — ADENOSINE 3 MG/ML
INJECTION, SOLUTION INTRAVENOUS
Status: COMPLETED
Start: 2019-04-22 | End: 2019-04-22

## 2019-04-22 RX ORDER — SODIUM CHLORIDE 9 MG/ML
INJECTION, SOLUTION INTRAVENOUS CONTINUOUS
Status: DISCONTINUED | OUTPATIENT
Start: 2019-04-22 | End: 2019-04-25

## 2019-04-22 RX ORDER — ASPIRIN 81 MG/1
324 TABLET, CHEWABLE ORAL ONCE
Status: DISCONTINUED | OUTPATIENT
Start: 2019-04-22 | End: 2019-04-22 | Stop reason: HOSPADM

## 2019-04-22 RX ORDER — HEPARIN SODIUM 5000 [USP'U]/ML
INJECTION, SOLUTION INTRAVENOUS; SUBCUTANEOUS
Status: COMPLETED
Start: 2019-04-22 | End: 2019-04-22

## 2019-04-22 RX ORDER — ASPIRIN 81 MG/1
324 TABLET, CHEWABLE ORAL ONCE
Status: COMPLETED | OUTPATIENT
Start: 2019-04-22 | End: 2019-04-22

## 2019-04-22 NOTE — PLAN OF CARE
Problem: Patient/Family Goals  Goal: Patient/Family Long Term Goal  Description  Patient's Long Term Goal: to maximize mobility    Interventions:  - take all meds as rx  -follow up w/ md as ordered  -follow diet and activity as ordered  -CV surgery consu bleeding noted. Pt c/o of L CP that radiated down arm- EKG performed- no changes. Dr Donahue Mins notified- see orders. 1000- Pt denies CP w/ nitro gttt    1030- R groin oozing, 5 min manual pressure and quick clot provided.  No further complications will tanika

## 2019-04-22 NOTE — PLAN OF CARE
Assumed care of pt @ 1900. AOx4. Anxious about procedure in am. On RA, denies SOB. NSR on tele. Nitro gtt infusing @ 10 mcg/kg/min. Skin prepped and consent in chart for angiogram in am.     Pt c/o intermittent chest pain, when up in bathroom.  IV morphine

## 2019-04-22 NOTE — PROGRESS NOTES
Cath:    LM: Mild disease. LAD: 50-60% proximal at septal. FFR 0.86. CX: 99% ostial.  RCA: Moderate diffuse with calcific lesion mid vessel (40% lesions). FFR 0.91.   LVG: Normal.    Sedation: F/V from [phone]    PLAN: FFR of LAD/RCA negative for \"hemo

## 2019-04-22 NOTE — PLAN OF CARE
Tele monitoring. I/o. Nitro gtt. Denies c/o chest pain at this time. Pain control with Morphine as needed. Pt request all b/p be done manually by staff.     Problem: Patient/Family Goals  Goal: Patient/Family Long Term Goal  Descriptio  Patient's Long Te

## 2019-04-22 NOTE — PROGRESS NOTES
[facility] Cardiology Progress Note        Shaila Hernandez Patient Status:  Observation    1945 MRN [id number]   [facility] 8NE-A Attending Rudolph Acosta MD   Hosp Day # 0 PCP DO Addis Austin Lab 04/21/19  0330 04/21/19  0754 04/21/19  1429   TROP <0.045 0.049* 0.198*       Recent Labs   Lab 04/21/19  0330   PTP 13.5   INR 0.99                 Impression:  Exertional chest pains with pain that woke him from sleep early 4/21 am.  Unstable brenda

## 2019-04-22 NOTE — PLAN OF CARE
Problem: Patient/Family Goals  Goal: Patient/Family Long Term Goal  Description  Patient's Long Term Goal: have no chest pain    Interventions:  - angiogram in am  - See additional Care Plan goals for specific interventions  4/22/2019 0102 by Syed Cisneros

## 2019-04-22 NOTE — PROGRESS NOTES
Seen and examined by Dr. Mirtha Vargas and CV surgery team. Jonny Ribera ordered; Plavix d/c'd. Stay in hospital until surgery; timing TBD.

## 2019-04-22 NOTE — PROGRESS NOTES
[facility] Hospitalist Progress Note     [facility]      SUBJECTIVE:  Patient with some increased chest pain yesterday afternoon with getting up to restroom  Nitro gtt started with improvement in symptoms  Lovenox given for anticoagulation  Better this mor report was reviewed. Dictated by: Lora Hayes DO on 4/21/2019 at 11:44     Approved by: Lora Hayes DO               Meds:     No current outpatient medications on file.       Current Facility-Administered Medications:  aspirin chewable tab 324 mg 324 confirming patient's medical history and medications.      Vickii Felty  Internal Medicine  [facility]

## 2019-04-23 PROCEDURE — 80048 BASIC METABOLIC PNL TOTAL CA: CPT | Performed by: INTERNAL MEDICINE

## 2019-04-23 PROCEDURE — 85730 THROMBOPLASTIN TIME PARTIAL: CPT | Performed by: INTERNAL MEDICINE

## 2019-04-23 PROCEDURE — 85025 COMPLETE CBC W/AUTO DIFF WBC: CPT | Performed by: INTERNAL MEDICINE

## 2019-04-23 PROCEDURE — 85610 PROTHROMBIN TIME: CPT | Performed by: PHYSICIAN ASSISTANT

## 2019-04-23 RX ORDER — HEPARIN SODIUM AND DEXTROSE 10000; 5 [USP'U]/100ML; G/100ML
1000 INJECTION INTRAVENOUS ONCE
Status: COMPLETED | OUTPATIENT
Start: 2019-04-23 | End: 2019-04-23

## 2019-04-23 RX ORDER — HEPARIN SODIUM AND DEXTROSE 10000; 5 [USP'U]/100ML; G/100ML
INJECTION INTRAVENOUS CONTINUOUS
Status: DISCONTINUED | OUTPATIENT
Start: 2019-04-23 | End: 2019-04-25

## 2019-04-23 RX ORDER — SENNOSIDES 8.6 MG
8.6 TABLET ORAL 2 TIMES DAILY
Status: DISCONTINUED | OUTPATIENT
Start: 2019-04-23 | End: 2019-04-30

## 2019-04-23 RX ORDER — BISACODYL 10 MG
10 SUPPOSITORY, RECTAL RECTAL
Status: DISCONTINUED | OUTPATIENT
Start: 2019-04-23 | End: 2019-04-25

## 2019-04-23 RX ORDER — HEPARIN SODIUM 5000 [USP'U]/ML
5000 INJECTION INTRAVENOUS; SUBCUTANEOUS ONCE
Status: COMPLETED | OUTPATIENT
Start: 2019-04-23 | End: 2019-04-23

## 2019-04-23 RX ORDER — POLYETHYLENE GLYCOL 3350 17 G/17G
17 POWDER, FOR SOLUTION ORAL DAILY PRN
Status: DISCONTINUED | OUTPATIENT
Start: 2019-04-23 | End: 2019-04-25

## 2019-04-23 RX ORDER — DOCUSATE SODIUM 100 MG/1
100 CAPSULE, LIQUID FILLED ORAL 2 TIMES DAILY
Status: DISCONTINUED | OUTPATIENT
Start: 2019-04-23 | End: 2019-04-25

## 2019-04-23 NOTE — PLAN OF CARE
Problem: Patient/Family Goals  Goal: Patient/Family Long Term Goal  Description  Patient's Long Term Goal: Return home. Interventions:  - Cabg Thursday, Nitro Drip and continue to monitor pain.    - See additional Care Plan goals for specific interven

## 2019-04-23 NOTE — PROGRESS NOTES
[facility] Hospitalist Progress Note     [facility]      SUBJECTIVE:  Feeling ok today  Had LHC done yesterday  Started on heparin gtt    OBJECTIVE:  Temp:  [97.8 °F (36.6 °C)-98.8 °F (37.1 °C)] 97.8 °F (36.6 °C)  Pulse:  [76-96] 85  Resp:  [18-20] 18  BP: CONCLUSION:  Exam is within normal limits. The preliminary report was reviewed. Dictated by: Erik Lee DO on 4/21/2019 at 11:44     Approved by: Erik Lee DO               Meds:     No current outpatient medications on file.       Current Fac regimen    Prophy:  DVT: SCDs     Dispo: admitted for chest pain, plans for CABG     Outpatient records reviewed confirming patient's medical history and medications.      Cassius Delvalle  Internal Medicine  [facility]

## 2019-04-23 NOTE — PLAN OF CARE
Problem: Patient/Family Goals  Goal: Patient/Family Long Term Goal  Description  Patient's Long Term Goal: \"no chest pain and stay out of hospital.\"    Interventions:  - take medications as rx, follow up with physicians, healthy diet, exercise as raleigh levels.

## 2019-04-23 NOTE — PROGRESS NOTES
[facility] Cardiology Progress Note        Geo Rivera Patient Status:  Observation    1945 MRN [id number]   [facility] 8NE-A Attending Christian Palomo MD   [address] Day # 1 PCP DO Jeremie Frost 04/21/19  1429 04/22/19  0756   TROP 0.049* 0.198* 3.350*       Recent Labs   Lab 04/21/19  0330 04/22/19  0756 04/23/19  0448   PTP 13.5 14.0 13.7   INR 0.99 1.04 1.01                 Impression:  Exertional chest pains with pain that woke him from sleep

## 2019-04-24 ENCOUNTER — ANESTHESIA EVENT (OUTPATIENT)
Dept: CARDIAC SURGERY | Facility: HOSPITAL | Age: 74
End: 2019-04-24

## 2019-04-24 PROCEDURE — 80048 BASIC METABOLIC PNL TOTAL CA: CPT | Performed by: INTERNAL MEDICINE

## 2019-04-24 PROCEDURE — 85730 THROMBOPLASTIN TIME PARTIAL: CPT | Performed by: INTERNAL MEDICINE

## 2019-04-24 PROCEDURE — 83036 HEMOGLOBIN GLYCOSYLATED A1C: CPT | Performed by: PHYSICIAN ASSISTANT

## 2019-04-24 PROCEDURE — 86920 COMPATIBILITY TEST SPIN: CPT

## 2019-04-24 PROCEDURE — 86850 RBC ANTIBODY SCREEN: CPT | Performed by: PHYSICIAN ASSISTANT

## 2019-04-24 PROCEDURE — 85027 COMPLETE CBC AUTOMATED: CPT | Performed by: INTERNAL MEDICINE

## 2019-04-24 PROCEDURE — 86901 BLOOD TYPING SEROLOGIC RH(D): CPT | Performed by: PHYSICIAN ASSISTANT

## 2019-04-24 PROCEDURE — 85049 AUTOMATED PLATELET COUNT: CPT | Performed by: INTERNAL MEDICINE

## 2019-04-24 PROCEDURE — 85025 COMPLETE CBC W/AUTO DIFF WBC: CPT | Performed by: INTERNAL MEDICINE

## 2019-04-24 PROCEDURE — 85576 BLOOD PLATELET AGGREGATION: CPT | Performed by: PHYSICIAN ASSISTANT

## 2019-04-24 PROCEDURE — 86900 BLOOD TYPING SEROLOGIC ABO: CPT | Performed by: PHYSICIAN ASSISTANT

## 2019-04-24 NOTE — CM/SW NOTE
Met with patient with spouse @ bedside to discuss discharge planning per CABG protocol. Patient, a retired  from [email or web address], resides with spouse of 52 years in a 2 level home in [place] [address].   There are 3 steps to entire the multi level home wit

## 2019-04-24 NOTE — PLAN OF CARE
Problem: Patient/Family Goals  Goal: Patient/Family Long Term Goal  Description  Patient's Long Term Goal: resolve chest pain and stay out of hospital.    Interventions:  - take prescribed rx, follow up with physicians, healthy diet, exercise as tolerate plan for CABG tomorrow, heparin and nitro gtt. Consents signed and placed on chart.

## 2019-04-24 NOTE — ANESTHESIA PREPROCEDURE EVALUATION
PRE-OP EVALUATION    Patient Name: Claudy Wright    Pre-op Diagnosis: coronary artery disease    Procedure(s):  coronary artery bypass grafting           IP 8620    Surgeon(s) and Role:     * London Alexander MD - Primary    Pre-op vitals reviewed.   Te [COMPLETED] aspirin chewable tab 324 mg 324 mg Oral Once   [COMPLETED] metoprolol Tartrate (LOPRESSOR) 5 MG/5ML injection 5 mg 5 mg Intravenous Once   [COMPLETED] morphINE sulfate (PF) 4 MG/ML injection 2 mg 2 mg Intravenous Once   [COMPLETED] famoTIDine Endo/Other    Negative endo/other ROS. Pulmonary    Negative pulmonary ROS.                        Neuro/Psych                   (+) headaches           Patient Active Problem List:     S/P angioplasty with stent general  NPO status verified and patient meets guidelines. Comment:  I explained intrinsic risks of general anesthesia, including nausea, dental damage, sore throat, mouth injury,and hoarseness from airway management.   All questions were answered an

## 2019-04-24 NOTE — PROGRESS NOTES
[facility] Hospitalist Progress Note     [facility]      SUBJECTIVE:  No acute events overnight  Had BM    OBJECTIVE:  Temp:  [97.6 °F (36.4 °C)-98.6 °F (37 °C)] 97.9 °F (36.6 °C)  Pulse:  [64-78] 69  Resp:  [18] 18  BP: (104-117)/(57-78) 108/78    Intake/ unremarkable. No significant osseous abnormalities. CONCLUSION:  Exam is within normal limits. The preliminary report was reviewed. Dictated by: Lidia Martinez DO on 4/21/2019 at 11:44     Approved by:  William Hurd DO               Meds:     No cu CAD s/p PCI  - ASA.  plavix being held  - Cardiology c/s -- LHC done 4/23 --> CV surgery c/s, plans for CABG tomorrow  - IV heparin, metoprolol started per cardiology     # HTN/HLD  - Lisinopril in place of home benazepril   - patient intolerant of all stat

## 2019-04-24 NOTE — PROGRESS NOTES
[facility] Cardiology Progress Note        Kalenkarlo Amorglenn Patient Status:  Observation    1945 MRN [id number]   [facility] 8NE-A Attending Mahnaz Jin MD   [facility] Day # 2 PCP DO Jesse Angulo 04/24/19  0508    138 139 139   K 4.0 4.0 4.2 4.1    108 109 109   CO2 25.0 23.0 25.0 24.0   BUN 13 13 15 20*   CREATSERUM 1.24 0.91 0.99 1.08   CA 9.2 8.9 8.6 8.9   GLU 96 91 95 91       Recent Labs   Lab 04/21/19  0330   ALT 83*   AST 35   AL

## 2019-04-24 NOTE — PROGRESS NOTES
Met with patient and wife in PAT to discuss pre op teaching, post op expectations, discharge planning. Discussed roles of CM/SW, PT/OT, Cardiac rehab in education and recovery. All questions answered, binder given.     Rica Kirk RN  Clinical Coordinato

## 2019-04-24 NOTE — PLAN OF CARE
Tele monitoring. Nitro/heparin gtt. Cabg on Thursday. Denies chest pain or discomfort. No shortness of breath.   Problem: Patient/Family Goals  Goal: Patient/Family Long Term Goal  Description  Patient's Long Term Goal: resolve chest pain and stay out of ho

## 2019-04-25 ENCOUNTER — APPOINTMENT (OUTPATIENT)
Dept: GENERAL RADIOLOGY | Facility: HOSPITAL | Age: 74
DRG: 234 | End: 2019-04-25
Attending: PHYSICIAN ASSISTANT
Payer: MEDICARE

## 2019-04-25 ENCOUNTER — ANESTHESIA (OUTPATIENT)
Dept: CARDIAC SURGERY | Facility: HOSPITAL | Age: 74
End: 2019-04-25

## 2019-04-25 PROCEDURE — 85730 THROMBOPLASTIN TIME PARTIAL: CPT | Performed by: PHYSICIAN ASSISTANT

## 2019-04-25 PROCEDURE — 82803 BLOOD GASES ANY COMBINATION: CPT | Performed by: ANESTHESIOLOGY

## 2019-04-25 PROCEDURE — 83050 HGB METHEMOGLOBIN QUAN: CPT | Performed by: ANESTHESIOLOGY

## 2019-04-25 PROCEDURE — 87086 URINE CULTURE/COLONY COUNT: CPT | Performed by: THORACIC SURGERY (CARDIOTHORACIC VASCULAR SURGERY)

## 2019-04-25 PROCEDURE — 85610 PROTHROMBIN TIME: CPT | Performed by: THORACIC SURGERY (CARDIOTHORACIC VASCULAR SURGERY)

## 2019-04-25 PROCEDURE — 81001 URINALYSIS AUTO W/SCOPE: CPT | Performed by: THORACIC SURGERY (CARDIOTHORACIC VASCULAR SURGERY)

## 2019-04-25 PROCEDURE — 36430 TRANSFUSION BLD/BLD COMPNT: CPT | Performed by: THORACIC SURGERY (CARDIOTHORACIC VASCULAR SURGERY)

## 2019-04-25 PROCEDURE — 85384 FIBRINOGEN ACTIVITY: CPT | Performed by: PHYSICIAN ASSISTANT

## 2019-04-25 PROCEDURE — 93005 ELECTROCARDIOGRAM TRACING: CPT

## 2019-04-25 PROCEDURE — S0028 INJECTION, FAMOTIDINE, 20 MG: HCPCS | Performed by: PHYSICIAN ASSISTANT

## 2019-04-25 PROCEDURE — 82330 ASSAY OF CALCIUM: CPT

## 2019-04-25 PROCEDURE — 83735 ASSAY OF MAGNESIUM: CPT | Performed by: PHYSICIAN ASSISTANT

## 2019-04-25 PROCEDURE — 85384 FIBRINOGEN ACTIVITY: CPT | Performed by: THORACIC SURGERY (CARDIOTHORACIC VASCULAR SURGERY)

## 2019-04-25 PROCEDURE — 87081 CULTURE SCREEN ONLY: CPT | Performed by: THORACIC SURGERY (CARDIOTHORACIC VASCULAR SURGERY)

## 2019-04-25 PROCEDURE — 85610 PROTHROMBIN TIME: CPT | Performed by: PHYSICIAN ASSISTANT

## 2019-04-25 PROCEDURE — 85730 THROMBOPLASTIN TIME PARTIAL: CPT | Performed by: INTERNAL MEDICINE

## 2019-04-25 PROCEDURE — 5A1221Z PERFORMANCE OF CARDIAC OUTPUT, CONTINUOUS: ICD-10-PCS | Performed by: THORACIC SURGERY (CARDIOTHORACIC VASCULAR SURGERY)

## 2019-04-25 PROCEDURE — 80048 BASIC METABOLIC PNL TOTAL CA: CPT | Performed by: PHYSICIAN ASSISTANT

## 2019-04-25 PROCEDURE — P9045 ALBUMIN (HUMAN), 5%, 250 ML: HCPCS

## 2019-04-25 PROCEDURE — 85049 AUTOMATED PLATELET COUNT: CPT | Performed by: INTERNAL MEDICINE

## 2019-04-25 PROCEDURE — 82962 GLUCOSE BLOOD TEST: CPT

## 2019-04-25 PROCEDURE — 94150 VITAL CAPACITY TEST: CPT

## 2019-04-25 PROCEDURE — 84132 ASSAY OF SERUM POTASSIUM: CPT

## 2019-04-25 PROCEDURE — 86927 PLASMA FRESH FROZEN: CPT

## 2019-04-25 PROCEDURE — 85014 HEMATOCRIT: CPT

## 2019-04-25 PROCEDURE — 85025 COMPLETE CBC W/AUTO DIFF WBC: CPT | Performed by: THORACIC SURGERY (CARDIOTHORACIC VASCULAR SURGERY)

## 2019-04-25 PROCEDURE — 85730 THROMBOPLASTIN TIME PARTIAL: CPT | Performed by: THORACIC SURGERY (CARDIOTHORACIC VASCULAR SURGERY)

## 2019-04-25 PROCEDURE — 84295 ASSAY OF SERUM SODIUM: CPT

## 2019-04-25 PROCEDURE — 94002 VENT MGMT INPAT INIT DAY: CPT

## 2019-04-25 PROCEDURE — 85347 COAGULATION TIME ACTIVATED: CPT

## 2019-04-25 PROCEDURE — 85049 AUTOMATED PLATELET COUNT: CPT | Performed by: THORACIC SURGERY (CARDIOTHORACIC VASCULAR SURGERY)

## 2019-04-25 PROCEDURE — 02100Z9 BYPASS CORONARY ARTERY, ONE ARTERY FROM LEFT INTERNAL MAMMARY, OPEN APPROACH: ICD-10-PCS | Performed by: THORACIC SURGERY (CARDIOTHORACIC VASCULAR SURGERY)

## 2019-04-25 PROCEDURE — 82375 ASSAY CARBOXYHB QUANT: CPT | Performed by: ANESTHESIOLOGY

## 2019-04-25 PROCEDURE — 87040 BLOOD CULTURE FOR BACTERIA: CPT | Performed by: THORACIC SURGERY (CARDIOTHORACIC VASCULAR SURGERY)

## 2019-04-25 PROCEDURE — 85018 HEMOGLOBIN: CPT | Performed by: ANESTHESIOLOGY

## 2019-04-25 PROCEDURE — 82803 BLOOD GASES ANY COMBINATION: CPT

## 2019-04-25 PROCEDURE — 06BQ4ZZ EXCISION OF LEFT SAPHENOUS VEIN, PERCUTANEOUS ENDOSCOPIC APPROACH: ICD-10-PCS | Performed by: THORACIC SURGERY (CARDIOTHORACIC VASCULAR SURGERY)

## 2019-04-25 PROCEDURE — 85025 COMPLETE CBC W/AUTO DIFF WBC: CPT | Performed by: INTERNAL MEDICINE

## 2019-04-25 PROCEDURE — 93010 ELECTROCARDIOGRAM REPORT: CPT | Performed by: INTERNAL MEDICINE

## 2019-04-25 PROCEDURE — 021109W BYPASS CORONARY ARTERY, TWO ARTERIES FROM AORTA WITH AUTOLOGOUS VENOUS TISSUE, OPEN APPROACH: ICD-10-PCS | Performed by: THORACIC SURGERY (CARDIOTHORACIC VASCULAR SURGERY)

## 2019-04-25 PROCEDURE — 85027 COMPLETE CBC AUTOMATED: CPT | Performed by: PHYSICIAN ASSISTANT

## 2019-04-25 PROCEDURE — 80048 BASIC METABOLIC PNL TOTAL CA: CPT | Performed by: INTERNAL MEDICINE

## 2019-04-25 PROCEDURE — 71045 X-RAY EXAM CHEST 1 VIEW: CPT | Performed by: PHYSICIAN ASSISTANT

## 2019-04-25 RX ORDER — POLYETHYLENE GLYCOL 3350 17 G/17G
1 POWDER, FOR SOLUTION ORAL DAILY PRN
Status: DISCONTINUED | OUTPATIENT
Start: 2019-04-25 | End: 2019-04-30

## 2019-04-25 RX ORDER — ACETAMINOPHEN 10 MG/ML
1000 INJECTION, SOLUTION INTRAVENOUS EVERY 6 HOURS
Status: COMPLETED | OUTPATIENT
Start: 2019-04-25 | End: 2019-04-26

## 2019-04-25 RX ORDER — ALBUMIN, HUMAN INJ 5% 5 %
250 SOLUTION INTRAVENOUS ONCE AS NEEDED
Status: DISCONTINUED | OUTPATIENT
Start: 2019-04-25 | End: 2019-04-30

## 2019-04-25 RX ORDER — HYDROCODONE BITARTRATE AND ACETAMINOPHEN 10; 325 MG/1; MG/1
2 TABLET ORAL EVERY 4 HOURS PRN
Status: DISCONTINUED | OUTPATIENT
Start: 2019-04-25 | End: 2019-04-25

## 2019-04-25 RX ORDER — DOBUTAMINE HYDROCHLORIDE 200 MG/100ML
INJECTION INTRAVENOUS CONTINUOUS PRN
Status: DISCONTINUED | OUTPATIENT
Start: 2019-04-25 | End: 2019-04-30

## 2019-04-25 RX ORDER — ASPIRIN 325 MG
325 TABLET ORAL ONCE
Status: DISCONTINUED | OUTPATIENT
Start: 2019-04-25 | End: 2019-04-30

## 2019-04-25 RX ORDER — MIDAZOLAM HYDROCHLORIDE 1 MG/ML
INJECTION INTRAMUSCULAR; INTRAVENOUS
Status: COMPLETED
Start: 2019-04-25 | End: 2019-04-25

## 2019-04-25 RX ORDER — HYDROCODONE BITARTRATE AND ACETAMINOPHEN 10; 325 MG/1; MG/1
1 TABLET ORAL EVERY 4 HOURS PRN
Status: DISCONTINUED | OUTPATIENT
Start: 2019-04-25 | End: 2019-04-25

## 2019-04-25 RX ORDER — TEMAZEPAM 15 MG/1
15 CAPSULE ORAL NIGHTLY PRN
Status: DISCONTINUED | OUTPATIENT
Start: 2019-04-25 | End: 2019-04-30

## 2019-04-25 RX ORDER — MORPHINE SULFATE 4 MG/ML
2 INJECTION, SOLUTION INTRAMUSCULAR; INTRAVENOUS
Status: DISCONTINUED | OUTPATIENT
Start: 2019-04-25 | End: 2019-04-30

## 2019-04-25 RX ORDER — MORPHINE SULFATE 4 MG/ML
4 INJECTION, SOLUTION INTRAMUSCULAR; INTRAVENOUS
Status: DISCONTINUED | OUTPATIENT
Start: 2019-04-25 | End: 2019-04-30

## 2019-04-25 RX ORDER — CEFAZOLIN SODIUM/WATER 2 G/20 ML
2 SYRINGE (ML) INTRAVENOUS EVERY 8 HOURS
Status: DISPENSED | OUTPATIENT
Start: 2019-04-25 | End: 2019-04-27

## 2019-04-25 RX ORDER — ONDANSETRON 2 MG/ML
4 INJECTION INTRAMUSCULAR; INTRAVENOUS EVERY 6 HOURS PRN
Status: DISCONTINUED | OUTPATIENT
Start: 2019-04-25 | End: 2019-04-30

## 2019-04-25 RX ORDER — FAMOTIDINE 10 MG/ML
20 INJECTION, SOLUTION INTRAVENOUS 2 TIMES DAILY
Status: DISCONTINUED | OUTPATIENT
Start: 2019-04-25 | End: 2019-04-26

## 2019-04-25 RX ORDER — DEXTROSE AND SODIUM CHLORIDE 5; .45 G/100ML; G/100ML
INJECTION, SOLUTION INTRAVENOUS CONTINUOUS
Status: ACTIVE | OUTPATIENT
Start: 2019-04-25 | End: 2019-04-26

## 2019-04-25 RX ORDER — FAMOTIDINE 20 MG/1
20 TABLET ORAL 2 TIMES DAILY
Status: DISCONTINUED | OUTPATIENT
Start: 2019-04-25 | End: 2019-04-30

## 2019-04-25 RX ORDER — CHLORHEXIDINE GLUCONATE 0.12 MG/ML
15 RINSE ORAL
Status: CANCELLED | OUTPATIENT
Start: 2019-04-25

## 2019-04-25 RX ORDER — DEXMEDETOMIDINE HYDROCHLORIDE 4 UG/ML
INJECTION, SOLUTION INTRAVENOUS CONTINUOUS
Status: DISCONTINUED | OUTPATIENT
Start: 2019-04-25 | End: 2019-04-25

## 2019-04-25 RX ORDER — BISACODYL 10 MG
10 SUPPOSITORY, RECTAL RECTAL
Status: DISCONTINUED | OUTPATIENT
Start: 2019-04-25 | End: 2019-04-30

## 2019-04-25 RX ORDER — MIDAZOLAM HYDROCHLORIDE 1 MG/ML
2 INJECTION INTRAMUSCULAR; INTRAVENOUS ONCE
Status: COMPLETED | OUTPATIENT
Start: 2019-04-25 | End: 2019-04-25

## 2019-04-25 RX ORDER — MIDAZOLAM HYDROCHLORIDE 1 MG/ML
1 INJECTION INTRAMUSCULAR; INTRAVENOUS EVERY 30 MIN PRN
Status: CANCELLED | OUTPATIENT
Start: 2019-04-25

## 2019-04-25 RX ORDER — DEXTROSE MONOHYDRATE 25 G/50ML
50 INJECTION, SOLUTION INTRAVENOUS
Status: DISCONTINUED | OUTPATIENT
Start: 2019-04-25 | End: 2019-04-30

## 2019-04-25 RX ORDER — POTASSIUM CHLORIDE 14.9 MG/ML
20 INJECTION INTRAVENOUS AS NEEDED
Status: DISCONTINUED | OUTPATIENT
Start: 2019-04-25 | End: 2019-04-30

## 2019-04-25 RX ORDER — MAGNESIUM SULFATE 1 G/100ML
1 INJECTION INTRAVENOUS AS NEEDED
Status: DISCONTINUED | OUTPATIENT
Start: 2019-04-25 | End: 2019-04-30

## 2019-04-25 RX ORDER — DEXMEDETOMIDINE HYDROCHLORIDE 4 UG/ML
INJECTION, SOLUTION INTRAVENOUS CONTINUOUS
Status: DISCONTINUED | OUTPATIENT
Start: 2019-04-25 | End: 2019-04-26

## 2019-04-25 RX ORDER — DOCUSATE SODIUM 100 MG/1
100 CAPSULE, LIQUID FILLED ORAL 2 TIMES DAILY
Status: DISCONTINUED | OUTPATIENT
Start: 2019-04-25 | End: 2019-04-30

## 2019-04-25 RX ORDER — CEFAZOLIN SODIUM/WATER 2 G/20 ML
SYRINGE (ML) INTRAVENOUS
Status: DISCONTINUED | OUTPATIENT
Start: 2019-04-25 | End: 2019-04-28

## 2019-04-25 RX ORDER — VANCOMYCIN HYDROCHLORIDE 1 G/20ML
INJECTION, POWDER, LYOPHILIZED, FOR SOLUTION INTRAVENOUS
Status: DISCONTINUED | OUTPATIENT
Start: 2019-04-25 | End: 2019-04-25 | Stop reason: HOSPADM

## 2019-04-25 RX ORDER — MAGNESIUM SULFATE HEPTAHYDRATE 40 MG/ML
2 INJECTION, SOLUTION INTRAVENOUS AS NEEDED
Status: DISCONTINUED | OUTPATIENT
Start: 2019-04-25 | End: 2019-04-30

## 2019-04-25 RX ORDER — MORPHINE SULFATE 4 MG/ML
8 INJECTION, SOLUTION INTRAMUSCULAR; INTRAVENOUS
Status: DISCONTINUED | OUTPATIENT
Start: 2019-04-25 | End: 2019-04-30

## 2019-04-25 RX ORDER — NITROGLYCERIN 20 MG/100ML
INJECTION INTRAVENOUS CONTINUOUS PRN
Status: DISCONTINUED | OUTPATIENT
Start: 2019-04-25 | End: 2019-04-30

## 2019-04-25 RX ORDER — CEFAZOLIN SODIUM/WATER 2 G/20 ML
SYRINGE (ML) INTRAVENOUS
Status: DISCONTINUED | OUTPATIENT
Start: 2019-04-25 | End: 2019-04-25 | Stop reason: HOSPADM

## 2019-04-25 RX ORDER — ASPIRIN 300 MG
300 SUPPOSITORY, RECTAL RECTAL ONCE
Status: DISCONTINUED | OUTPATIENT
Start: 2019-04-25 | End: 2019-04-30

## 2019-04-25 RX ORDER — POTASSIUM CHLORIDE 29.8 MG/ML
40 INJECTION INTRAVENOUS AS NEEDED
Status: DISCONTINUED | OUTPATIENT
Start: 2019-04-25 | End: 2019-04-30

## 2019-04-25 RX ORDER — DEXTROSE AND SODIUM CHLORIDE 5; .45 G/100ML; G/100ML
INJECTION, SOLUTION INTRAVENOUS CONTINUOUS
Status: ACTIVE | OUTPATIENT
Start: 2019-04-25 | End: 2019-04-25

## 2019-04-25 RX ORDER — SODIUM CHLORIDE 9 MG/ML
INJECTION, SOLUTION INTRAVENOUS CONTINUOUS
Status: DISCONTINUED | OUTPATIENT
Start: 2019-04-25 | End: 2019-04-26

## 2019-04-25 NOTE — PLAN OF CARE
1245-received pt from cvor s/p cabg x 3. Pt intubated, ambued per anesthesia. Monitor sr-st on dobutamine at 5mcg/kg/min, precedex at 0.4mcg/kg/hr. Pt restless/squires but not following commands at this time. See flowsheet for v/s and precedex titrations.

## 2019-04-25 NOTE — PROGRESS NOTES
Received report on the patient from Rober Elam RN at 07:40 AM.  At the end of bedside report the OR department came to transport the patient to [place] [address].

## 2019-04-25 NOTE — PROGRESS NOTES
[facility] Hospitalist Progress Note     [facility]      SUBJECTIVE:  Awaiting cabg.           OBJECTIVE:  Temp:  [96.1 °F (35.6 °C)-100.7 °F (38.2 °C)] 100.7 °F (38.2 °C)  Pulse:  [] 112  Resp:  [12-25] 24  BP: ()/(66-90) 112/67  Arterial Line PORTABLE  (CPT=71045)  TECHNIQUE:  AP chest radiograph was obtained. COMPARISON:  None. INDICATIONS:  substernal chest pain awoke him at 0215, denies SOB, Nausea, or radiation.   PATIENT STATED HISTORY: (As transcribed by Technologist)  Mid chest pain Hx docusate sodium (COLACE) cap 100 mg 100 mg Oral BID   Or      docusate sodium (COLACE) liquid 100 mg 100 mg Oral BID   magnesium hydroxide (MILK OF MAGNESIA) 400 MG/5ML suspension 10 mL 10 mL Oral BID PRN   PEG 3350 (MIRALAX) powder packet 17 g 1 packet aspirin EC tab 81 mg 81 mg Oral Nightly        Assessment/Plan:      Mr. Mya Caceres is a 73 yo male with PMH of CAD s/p prior PCI (8/7/18) and POBA (1/11/19), HTN, HLD, migraines who presented to the ED with chest pressure.     # NSTEMI/Unstable angina  # CA

## 2019-04-25 NOTE — PLAN OF CARE
PLAN OF CARE - SHIFT NOTE    Patient is ANOx4, on RA , tele NSR/ST, cont B/B, UAL. Patient had cath 4/22, R Groin perclose. C/D/I soft NATANAEL. NPO @ MN for CABG in AM with Nikole. Prepped per protocol, consents signed and in chart. On heparin gtt as ordered.  On monitor.     Problem: Patient/Family Goals  Goal: Patient/Family Long Term Goal  Description  Patient's Long Term Goal: resolve chest pain and stay out of hospital.    Interventions:  - take prescribed rx, follow up with physicians, healthy diet, exercise a electrolyte replacements, including rhythm and repeat lab results as appropriate  - Fluid restriction as ordered  - Instruct patient on fluid and nutrition restrictions as appropriate  Outcome: Progressing  Goal: Hemodynamic stability and optimal renal fun pressure ulcer development  - Assess and document skin integrity  - Assess and document dressing/incision, wound bed, drain sites and surrounding tissue  - Implement wound care per orders  - Initiate isolation precautions as appropriate  - Initiate Pressur

## 2019-04-25 NOTE — ANESTHESIA POSTPROCEDURE EVALUATION
[address] Patient Status:  Inpatient   Age/Gender 72year old male MRN [id number]   [facility] 6NE-A Attending Symone Schuster MD   [facility] Day # 3 PCP Yokasta Jha DO       Anesthesia Post-op Note    Procedu

## 2019-04-25 NOTE — OPERATIVE REPORT
[facility]    PATIENT'S NAME: Yudi Bragg   ATTENDING PHYSICIAN: Giacomo Mckeon M.D.    OPERATING PHYSICIAN: Kylie Hodges MD   PATIENT ACCOUNT#:   [id number]    [facility]  MEDICAL RECORD #:   [id number]       DATE OF BIRTH: was too small for a bypass. The left ventricular branch to the right coronary artery was barely big enough at 1 mm for bypass. Segment of saphenous graft was placed here.   Cardioplegia was given following which the left internal mammary artery was placed

## 2019-04-25 NOTE — PROGRESS NOTES
[facility] Cardiology Progress Note        Milla Hoang Patient Status:  Observation    1945 MRN [id number]   [facility] 8NE-A Attending Shankar Crane MD   [facility] Day # 3 PCP DO Meyl Isaacs (35.8 °C)  Pulse:  [] 101  Resp:  [14-25] 25  BP: (104-168)/(76-90) 104/88  Arterial Line BP: ()/() 99/58  FiO2 (%):  [50 %] 50 %    General: comfortable. HEENT: No focal deficits. Neck: supple.  JVP normal  Cardiac: Regular rhythm, S1, ventricular branch to the right coronary artery.     1. CAD- S/P angioplasty and stent of proximal left circ and mid RCA with Xience stents 4/11.  Normal cardiolite prior.  Normal cardiolite 2/13.   Exertional angina with normal clite 7/18.  Cath 7/30/18 wi

## 2019-04-25 NOTE — CONSULTS
[facility]      Endocrinology Consultation    Aileen Cabrera Patient Status:  Inpatient    1945 MRN [id number]   [facility] 6NE-A Attending Charley Cao MD   [facility] Day # 3 PCP Feng Atwood DO     Reason for Consult CORONARY ANGIOPLASTY  08/07/2018, 1/2019    PTCA-Circumflex.  2011 and 2018   • NASAL SEPTOPLASTY WITH SUBMUCOUS RESECTION INFERIOR Bilateral 4/1/2009    Performed by Austen Brumfield MD at [facility]   • NUC STRESS TEST, CARDIO (DM mg in dextrose 5 % 250 mL infusion, 0.1-4 mcg/kg/min, Intravenous, Continuous PRN  •  aspirin 300 MG rectal suppository 300 mg, 300 mg, Rectal, Once **OR** aspirin tab 325 mg, 325 mg, Per NG Tube, Once  •  docusate sodium (COLACE) cap 100 mg, 100 mg, Oral, NaCl (PRECEDEX) 400 MCG/100ML premix infusion, 0.2-1.5 mcg/kg/hr, Intravenous, Continuous  •  ceFAZolin sodium (ANCEF/KEFZOL) 2 GM/20ML premix IV syringe, , , PRN  •  metoprolol Tartrate (LOPRESSOR) tab 25 mg, 25 mg, Oral, 2x Daily(Beta Blocker)  •  Senna MD  Endocrinology, Diabetes, and Metabolism  [facility]

## 2019-04-26 ENCOUNTER — APPOINTMENT (OUTPATIENT)
Dept: GENERAL RADIOLOGY | Facility: HOSPITAL | Age: 74
DRG: 234 | End: 2019-04-26
Attending: PHYSICIAN ASSISTANT
Payer: MEDICARE

## 2019-04-26 ENCOUNTER — APPOINTMENT (OUTPATIENT)
Dept: GENERAL RADIOLOGY | Facility: HOSPITAL | Age: 74
DRG: 234 | End: 2019-04-26
Attending: THORACIC SURGERY (CARDIOTHORACIC VASCULAR SURGERY)
Payer: MEDICARE

## 2019-04-26 PROCEDURE — 97162 PT EVAL MOD COMPLEX 30 MIN: CPT

## 2019-04-26 PROCEDURE — 85025 COMPLETE CBC W/AUTO DIFF WBC: CPT | Performed by: PHYSICIAN ASSISTANT

## 2019-04-26 PROCEDURE — 82962 GLUCOSE BLOOD TEST: CPT

## 2019-04-26 PROCEDURE — 97116 GAIT TRAINING THERAPY: CPT

## 2019-04-26 PROCEDURE — 71045 X-RAY EXAM CHEST 1 VIEW: CPT | Performed by: THORACIC SURGERY (CARDIOTHORACIC VASCULAR SURGERY)

## 2019-04-26 PROCEDURE — 71045 X-RAY EXAM CHEST 1 VIEW: CPT | Performed by: PHYSICIAN ASSISTANT

## 2019-04-26 PROCEDURE — 93010 ELECTROCARDIOGRAM REPORT: CPT | Performed by: INTERNAL MEDICINE

## 2019-04-26 PROCEDURE — 97165 OT EVAL LOW COMPLEX 30 MIN: CPT

## 2019-04-26 PROCEDURE — 83735 ASSAY OF MAGNESIUM: CPT | Performed by: PHYSICIAN ASSISTANT

## 2019-04-26 PROCEDURE — 93005 ELECTROCARDIOGRAM TRACING: CPT

## 2019-04-26 PROCEDURE — 80048 BASIC METABOLIC PNL TOTAL CA: CPT | Performed by: PHYSICIAN ASSISTANT

## 2019-04-26 RX ORDER — DEXTROSE MONOHYDRATE 25 G/50ML
50 INJECTION, SOLUTION INTRAVENOUS
Status: DISCONTINUED | OUTPATIENT
Start: 2019-04-26 | End: 2019-04-26

## 2019-04-26 RX ORDER — ROSUVASTATIN CALCIUM 10 MG/1
5 TABLET, COATED ORAL NIGHTLY
Status: DISCONTINUED | OUTPATIENT
Start: 2019-04-26 | End: 2019-04-30

## 2019-04-26 RX ORDER — KETOROLAC TROMETHAMINE 30 MG/ML
30 INJECTION, SOLUTION INTRAMUSCULAR; INTRAVENOUS ONCE
Status: COMPLETED | OUTPATIENT
Start: 2019-04-26 | End: 2019-04-26

## 2019-04-26 RX ORDER — HYDROCODONE BITARTRATE AND ACETAMINOPHEN 10; 325 MG/1; MG/1
2 TABLET ORAL EVERY 4 HOURS PRN
Status: DISCONTINUED | OUTPATIENT
Start: 2019-04-26 | End: 2019-04-30

## 2019-04-26 NOTE — PLAN OF CARE
Assumed care of this patient at [address] [place] [address]. A&O x 4, in no distress. Usual lines. Dobs and insulin gtt infusing. Complained of shoulder and back pain overnight, worse with movement, managed with PRN Morphine.   Also complained of nausea, given Zofran with rel

## 2019-04-26 NOTE — PHYSICAL THERAPY NOTE
PHYSICAL THERAPY EVALUATION - INPATIENT     Room Number: 9280/5255-I  Evaluation Date: 4/26/2019  Type of Evaluation: Initial  Physician Order: PT Eval and Treat    Presenting Problem: NSTEMI, s/p CABG 4/25/19  Reason for Therapy: Mobility Dysfunctio OBJECTIVE  Precautions: Sternal;Cardiac  Fall Risk: High fall risk    WEIGHT BEARING RESTRICTION  Weight Bearing Restriction: None                PAIN ASSESSMENT  Ratin          COGNITION  · Overall Cognitive Status:  WFL - within functional limits safety. Pt ambulates with steady step through gait pattern and good alejandrina. Pt given VC for posture and pacing. Pt returned to room sitting EOB. Pt educated on log roll technique. Pt sit-supine in bed with MIN assist for LE support.  Reviewed precautions, Established Goals: 5      CURRENT GOALS    Goal #1 Patient is able to demonstrate supine - sit EOB @ level: modified independent     Goal #2 Patient is able to demonstrate transfers EOB to/from [facility] at assistance level: modified independent     Goal #3 Luzma

## 2019-04-26 NOTE — HOME CARE LIAISON
Referral from Louise Beckman. Patient is a s/p CABG on 4/25/19. [facility] will see tomorrow to offer home health choice.   Thanks for the referral.

## 2019-04-26 NOTE — OCCUPATIONAL THERAPY NOTE
Attempted to see pt for OT eval, but spoke with RN who stated pt would be more appropriate later this morning. Will f/u later in the am as appropriate.

## 2019-04-26 NOTE — PROGRESS NOTES
[facility] Cardiology Progress Note        Delala Donnelly Patient Status:  Observation    1945 MRN [id number]   [facility] 8NE-A Attending Afsaneh Ibarra MD   [address] Day # 4 PCP DO Ashely Rod 97/65  Arterial Line BP: ()/() 103/47  FiO2 (%):  [50 %] 50 %    General: comfortable. HEENT: No focal deficits. Neck: supple. JVP normal  Cardiac: Regular rhythm, S1, S2 normal,  rub or gallop. No murmur.   Lungs: decreased at bases  Abdomen artery.     2. Previous CAD history - S/P angioplasty and stent of proximal left circ and mid RCA with Xience stents 4/11.  Normal cardiolite prior.  Normal cardiolite 2/13. Exertional angina with normal clite 7/18.  Cath 7/30/18 with ostial LCx.   PCI 8/7

## 2019-04-26 NOTE — PROGRESS NOTES
[facility] Hospitalist Progress Note     [facility]      SUBJECTIVE:  Sp CABG. Off pressors.         OBJECTIVE:  Temp:  [98.9 °F (37.2 °C)-101.6 °F (38.7 °C)] 99 °F (37.2 °C)  Pulse:  [] 86  Resp:  [14-35] 19  BP: ()/(57-75) 121/60  Arterial Relda Fraction 04/26/19  1001   [facility] 121* 112* 123* 111* 158*       Imaging:  Xr Chest Ap Portable  (cpt=71045)    Result Date: 4/21/2019  PROCEDURE:  XR CHEST AP PORTABLE  (CPT=71045)  TECHNIQUE:  AP chest radiograph was obtained. COMPARISON:  None.   INDICATIONS:  subs suppository 300 mg 300 mg Rectal Once   Or      aspirin tab 325 mg 325 mg Per NG Tube Once   docusate sodium (COLACE) cap 100 mg 100 mg Oral BID   Or      docusate sodium (COLACE) liquid 100 mg 100 mg Oral BID   magnesium hydroxide (MILK OF MAGNESIA) 400 M POBA (1/11/19), HTN, HLD, migraines who presented to the ED with chest pressure.     # NSTEMI/Unstable angina  # CAD s/p PCI  - sp CABG 4/25  - resume metoprolol, asa  - crestor low dose as per cards     # HTN/HLD  - see above     # Thrombocytopenia  - Chr

## 2019-04-26 NOTE — PROGRESS NOTES
[facility]  Progress Note    Amrita Sal Patient Status:  Inpatient    1945 MRN [id number]   [facility] 6NE-A Attending Trino Bahena MD   [facility] Day # 4 PCP Kaleb Villaseñor DO       SUBJECTIVE:  No acute events Encounters:  04/26/19 : 212 lb 4.9 oz (96.3 kg)  03/04/19 : 210 lb (95.3 kg)  01/15/19 : 205 lb (93 kg)    Constitutional Vital signs in last 24 hours:BP 97/65   Pulse 88   Temp 99 °F (37.2 °C) (Pulmonary Artery)   Resp 24   Ht 67\"   Wt 212 lb 4.9 oz (96. 4/27/2019] Insulin Aspart Pen (NOVOLOG) 100 UNIT/ML flexpen 1-25 Units 1-25 Units Subcutaneous Once   glucose (DEX4) oral liquid 15 g 15 g Oral Q15 Min PRN   Or      Glucose-Vitamin C (DEX-4) 4-6 GM-MG chewable tab 4 tablet 4 tablet Oral Q15 Min PRN   Or premix 20 mEq 20 mEq Intravenous PRN   Or      potassium chloride IVPB premix 40 mEq 40 mEq Intravenous PRN   calcium gluconate 3 g in sodium chloride 0.9% 100 mL IVPB 3 g Intravenous PRN   Magnesium Sulfate in D5W IVPB premix 1 g 1 g Intravenous PRN   Mag feel free to contact me with any questions or concerns.       Ochoa Garcia MD  Endocrinology, Diabetes, and Metabolism  [facility]  4/26/2019  8:47 AM

## 2019-04-26 NOTE — PROGRESS NOTES
[facility]  CV Surgery Progress Note    Eri Grajeda Patient Status:  Inpatient    1945 MRN [id number]   [facility] 6NE-A Attending Linh Jones MD   [facility] Day # 4 PCP Davonte Soto DO     Subjective:  Seen and ex bleeding  -Post op Anemia: expected, cbc in AM   -Febrile: wbc wnl, Cxs pending  -Vol OL:  Gentle diuresis once BP allows   -Pain management: avoid narcotics   -CT management: ok to d/c   -Encourage IS/Ambulation     Kp Hitchcock PA-C/Nikole  4/26/20

## 2019-04-26 NOTE — OCCUPATIONAL THERAPY NOTE
OCCUPATIONAL THERAPY EVALUATION - INPATIENT     Room Number: 5302/5372-W  Evaluation Date: 4/26/2019  Type of Evaluation: Initial  Presenting Problem: acute chest pain    Physician Order: IP Consult to Occupational Therapy  Reason for Therapy: ADL/IADL Dys chest tube. \"    Patient self-stated goal is to go home.       OBJECTIVE  Precautions: Sternal;Cardiac  Fall Risk: High fall risk    WEIGHT BEARING RESTRICTION  Weight Bearing Restriction: None                PAIN ASSESSMENT  Ratin  Location: incisional Pt up in the chair for eval.  Family present. Explained the role of OT while in the hospital.  Introduced sternal precautions in relation to completion of self-care and mobility. UE dressing and assessment completed.   Mobility performed in the room and h Making LOW - Analysis of occupational profile, problem-focused assessments, limited treatment options    Overall Complexity LOW     OT Discharge Recommendations: Home  OT Device Recommendations: None    PLAN  OT Treatment Plan: Balance activities; Energy co

## 2019-04-26 NOTE — CM/SW NOTE
Sw met with pt, wife and son to [place] [address] needs post CABG. Choice of HHC offered. Pt/wife would like referral to [facility]. Liaison paged . Chayito King, [address] [facility] /Dischage Planner  [phone]  Pager [phone]

## 2019-04-26 NOTE — PLAN OF CARE
Assumed care of pt post op day1 at 0730 today. PT up in chair and stayed up in chair until aproximately 1030 after being weaned off dobs and  being seen by PT and OT, ambulated in halls to facilitate chest tube drainage.  PT drained 280ml this am during ronald

## 2019-04-27 PROCEDURE — 94640 AIRWAY INHALATION TREATMENT: CPT

## 2019-04-27 PROCEDURE — 82962 GLUCOSE BLOOD TEST: CPT

## 2019-04-27 PROCEDURE — 85025 COMPLETE CBC W/AUTO DIFF WBC: CPT | Performed by: PHYSICIAN ASSISTANT

## 2019-04-27 PROCEDURE — 80048 BASIC METABOLIC PNL TOTAL CA: CPT | Performed by: PHYSICIAN ASSISTANT

## 2019-04-27 PROCEDURE — 93005 ELECTROCARDIOGRAM TRACING: CPT

## 2019-04-27 PROCEDURE — 93010 ELECTROCARDIOGRAM REPORT: CPT | Performed by: INTERNAL MEDICINE

## 2019-04-27 RX ORDER — IPRATROPIUM BROMIDE AND ALBUTEROL SULFATE 2.5; .5 MG/3ML; MG/3ML
3 SOLUTION RESPIRATORY (INHALATION) EVERY 4 HOURS
Status: DISCONTINUED | OUTPATIENT
Start: 2019-04-27 | End: 2019-04-27

## 2019-04-27 RX ORDER — AMIODARONE HYDROCHLORIDE 200 MG/1
400 TABLET ORAL DAILY
Status: DISCONTINUED | OUTPATIENT
Start: 2019-04-28 | End: 2019-04-28

## 2019-04-27 RX ORDER — IPRATROPIUM BROMIDE AND ALBUTEROL SULFATE 2.5; .5 MG/3ML; MG/3ML
3 SOLUTION RESPIRATORY (INHALATION) EVERY 4 HOURS PRN
Status: DISCONTINUED | OUTPATIENT
Start: 2019-04-27 | End: 2019-04-30

## 2019-04-27 RX ORDER — AMIODARONE HYDROCHLORIDE 200 MG/1
400 TABLET ORAL 3 TIMES DAILY
Status: DISCONTINUED | OUTPATIENT
Start: 2019-04-27 | End: 2019-04-27

## 2019-04-27 RX ORDER — DEXTROSE MONOHYDRATE 25 G/50ML
50 INJECTION, SOLUTION INTRAVENOUS
Status: DISCONTINUED | OUTPATIENT
Start: 2019-04-27 | End: 2019-04-30

## 2019-04-27 RX ORDER — METOPROLOL TARTRATE 5 MG/5ML
5 INJECTION INTRAVENOUS EVERY 4 HOURS PRN
Status: DISCONTINUED | OUTPATIENT
Start: 2019-04-27 | End: 2019-04-30

## 2019-04-27 RX ORDER — AMIODARONE HYDROCHLORIDE 200 MG/1
400 TABLET ORAL 3 TIMES DAILY
Status: COMPLETED | OUTPATIENT
Start: 2019-04-27 | End: 2019-04-27

## 2019-04-27 RX ORDER — FUROSEMIDE 10 MG/ML
20 INJECTION INTRAMUSCULAR; INTRAVENOUS ONCE
Status: COMPLETED | OUTPATIENT
Start: 2019-04-27 | End: 2019-04-27

## 2019-04-27 RX ORDER — DILTIAZEM HYDROCHLORIDE 60 MG/1
60 TABLET, FILM COATED ORAL AS NEEDED
Status: DISCONTINUED | OUTPATIENT
Start: 2019-04-27 | End: 2019-04-30

## 2019-04-27 NOTE — RESPIRATORY THERAPY NOTE
Patient received on room air sating 94%. Breath sounds- diminished/clear. Patient appears to be breathing comfortably. Plan to continue nebs as ordered.

## 2019-04-27 NOTE — PLAN OF CARE
Assumed care of patient at [facility]. Patient alert, oriented, and neurologically intact.  VSS and patient complaining of pain which is relieved with rest. Patient converted back to NSR at 0759 this AM. Amio gtt infusing, will discontinue per MD order following

## 2019-04-27 NOTE — PROGRESS NOTES
[facility] Cardiology Progress Note        Claudy Wright Patient Status:  Observation    1945 MRN [id number]   [facility] 8NE-A Attending Abdelrahman Kaur MD   [address] Day # 5 PCP Diane Calle, [facility] oz (95.8 kg)  04/21/19 0504 : 206 lb 6.4 oz (93.6 kg)  04/21/19 0325 : 190 lb (86.2 kg)  03/04/19 1119 : 210 lb (95.3 kg)  01/15/19 1058 : 205 lb (93 kg)         Physical Exam:    Temp:  [98.1 °F (36.7 °C)-99 °F (37.2 °C)] 98.1 °F (36.7 °C)  Pulse:  [31-26 Unstable angina/NSTEMI. -cath 4/22 shows critical ISR ostial LCx, 70% mid LAD, 60-70% distal RCA.   1. s/p CABG 4/25/19 - Coronary revascularization x3: Left internal mammary graft to the left anterior descending, saphenous vein graft to the obtuse margina

## 2019-04-27 NOTE — PROGRESS NOTES
[facility] Hospitalist Progress Note     [facility]      SUBJECTIVE:  Feels okay, trying to take a nap. Sat up in chair earlier. Ate some.    Had some afib with rvr earlier    OBJECTIVE:  Temp:  [97.6 °F (36.4 °C)-99 °F (37.2 °C)] 97.6 °F (36.4 °C)  Pulse: 04/27/19  1139   PGLU 126* 187* 113* 160* 149*       Imaging:  Xr Chest Ap Portable  (cpt=71045)    Result Date: 4/21/2019  PROCEDURE:  XR CHEST AP PORTABLE  (CPT=71045)  TECHNIQUE:  AP chest radiograph was obtained. COMPARISON:  None.   INDICATIONS:  subs morphINE sulfate (PF) 4 MG/ML injection 4 mg 4 mg Intravenous Q1H PRN   Or      morphINE sulfate (PF) 4 MG/ML injection 8 mg 8 mg Intravenous Q1H PRN   temazepam (RESTORIL) cap 15 mg 15 mg Oral Nightly PRN   Albumin Human (ALBUMINAR) 5 % solution 250 mL tab 8 tablet 8 tablet Oral Q15 Min PRN   ceFAZolin sodium (ANCEF/KEFZOL) 2 GM/20ML premix IV syringe   PRN   metoprolol Tartrate (LOPRESSOR) tab 25 mg 25 mg Oral 2x Daily(Beta Blocker)   Senna (SENOKOT) tab 8.6 mg 8.6 mg Oral BID   aspirin EC tab 81 mg 81

## 2019-04-27 NOTE — PROGRESS NOTES
[facility]  Progress Note    Kathy Spotted Patient Status:  Inpatient    1945 MRN [id number]   [facility] 6NE-A Attending Dariusz Jolley MD   [facility] Day # 5 PCP Israel Mcmullen DO       SUBJECTIVE:  No acute events Encounters:  04/27/19 : 211 lb 13.8 oz (96.1 kg)  03/04/19 : 210 lb (95.3 kg)  01/15/19 : 205 lb (93 kg)    Constitutional Vital signs in last 24 hours:/71   Pulse (!) 140   Temp 98.1 °F (36.7 °C) (Temporal)   Resp 25   Ht 67\"   Wt 211 lb 13.8 oz (9 tablet Oral Q15 Min PRN   Or      dextrose 50 % injection 50 mL 50 mL Intravenous Q15 Min PRN   Or      glucose (DEX4) oral liquid 30 g 30 g Oral Q15 Min PRN   Or      Glucose-Vitamin C (DEX-4) 4-6 GM-MG chewable tab 8 tablet 8 tablet Oral Q15 Min PRN   In mL IVPB 3 g Intravenous PRN   Magnesium Sulfate in D5W IVPB premix 1 g 1 g Intravenous PRN   Magnesium Sulfate IVPB premix SOLN 2 g 2 g Intravenous PRN   mupirocin (BACTROBAN) 2% nasal ointment OINT 1 Application 1 Application Nasal BID   ceFAZolin sodium

## 2019-04-27 NOTE — PROGRESS NOTES
[facility]   [facility] Progress Note    Jennifer Jim Patient Status:  Inpatient    1945 MRN [id number]   [facility] 6NE-A Attending Iram Ferguson MD   [facility] Day # 5 PCP Ivory Tripp DO     Subjective:    I had a good Or      morphINE sulfate (PF) 4 MG/ML injection 4 mg 4 mg Intravenous Q1H PRN   Or      morphINE sulfate (PF) 4 MG/ML injection 8 mg 8 mg Intravenous Q1H PRN   temazepam (RESTORIL) cap 15 mg 15 mg Oral Nightly PRN   Albumin Human (ALBUMINAR) 5 % solution oral liquid 30 g 30 g Oral Q15 Min PRN   Or      Glucose-Vitamin C (DEX-4) 4-6 GM-MG chewable tab 8 tablet 8 tablet Oral Q15 Min PRN   ceFAZolin sodium (ANCEF/KEFZOL) 2 GM/20ML premix IV syringe   PRN   metoprolol Tartrate (LOPRESSOR) tab 25 mg 25 mg Oral

## 2019-04-27 NOTE — PLAN OF CARE
Assumed care of pt @ 1930. Rec'd pt in bed, resting. Pt. Ambulated to BR without difficulty. Tele=ST, rate 100's, normotensive. Pt. Up to chair, resting comfortably. Pt. Stable, in no acute distress @ this time.

## 2019-04-27 NOTE — HOME CARE LIAISON
MET WITH PTNT AND OFFERED CHOICE  OF AGENCIES. PTNT AGREEABLE TO [facility]. MET WITH PTNT TO DISCUSS HOME HEALTH SERVICES AND COVERAGE CRITERIA. PTNT AGREEABLE TO [facility]. PTNT GIVEN RESIDENTIAL BROCHURE.  [facility]IDENTIAL WITH PROVIDE SN/PT ON DISC

## 2019-04-28 PROCEDURE — 82962 GLUCOSE BLOOD TEST: CPT

## 2019-04-28 RX ORDER — AMIODARONE HYDROCHLORIDE 200 MG/1
400 TABLET ORAL 2 TIMES DAILY WITH MEALS
Status: DISCONTINUED | OUTPATIENT
Start: 2019-04-28 | End: 2019-04-30

## 2019-04-28 RX ORDER — FUROSEMIDE 10 MG/ML
20 INJECTION INTRAMUSCULAR; INTRAVENOUS
Status: DISCONTINUED | OUTPATIENT
Start: 2019-04-28 | End: 2019-04-28

## 2019-04-28 RX ORDER — FUROSEMIDE 10 MG/ML
20 INJECTION INTRAMUSCULAR; INTRAVENOUS DAILY
Status: DISCONTINUED | OUTPATIENT
Start: 2019-04-28 | End: 2019-04-29

## 2019-04-28 NOTE — PLAN OF CARE
Assumed care of pt @ 1930. Rec'd pt in bed, resting. Tele=Afib, rate 110-120, converted to NSR @ 1950. Pt. Stable, in no acute distress @ this time, asymptomatic.

## 2019-04-28 NOTE — PLAN OF CARE
Assumed care of pt at 1600, transferred from ccu. Pt alert able to make needs known. Steri strips to sternum incision CDI. Dressing to abdominal area intact. Pacer wires still in. Plan of care discussed with pt, and spouse verbalized understanding.  Marlin Vasquez

## 2019-04-28 NOTE — PROGRESS NOTES
[facility] Hospitalist Progress Note     [facility]      SUBJECTIVE:  Feels well, walking around    OBJECTIVE:  Temp:  [97.6 °F (36.4 °C)-99.1 °F (37.3 °C)] 98.1 °F (36.7 °C)  Pulse:  [] 82  Resp:  [19-45] 23  BP: ()/(54-81) 126/69    Intake/Ou 113*       Imaging:  Xr Chest Ap Portable  (cpt=71045)    Result Date: 4/21/2019  PROCEDURE:  XR CHEST AP PORTABLE  (CPT=71045)  TECHNIQUE:  AP chest radiograph was obtained. COMPARISON:  None.   INDICATIONS:  substernal chest pain awoke him at 0215, denie sulfate (PF) 4 MG/ML injection 8 mg 8 mg Intravenous Q1H PRN   temazepam (RESTORIL) cap 15 mg 15 mg Oral Nightly PRN   Albumin Human (ALBUMINAR) 5 % solution 250 mL 250 mL Intravenous Once PRN   DOBUTamine in D5W (DOBUTREX) 500 mg/250 ml infusion 2.5-40 mc Oral 2x Daily(Beta Blocker)   Senna (SENOKOT) tab 8.6 mg 8.6 mg Oral BID   aspirin EC tab 81 mg 81 mg Oral Nightly        Assessment/Plan:      Mr. Sisi Berry is a 73 yo male with PMH of CAD s/p prior PCI (8/7/18) and POBA (1/11/19), HTN, HLD, migraines who pr

## 2019-04-28 NOTE — PROGRESS NOTES
Assumed care @ 0730. Pt awake,alert and DAVILA. Tele- Sr 80's. Pt denies any c/o pain. Walking well and tolerating diet. On room air. Orders for CTU. Pt and wife updated on plan of care.

## 2019-04-28 NOTE — PROGRESS NOTES
[facility] Cardiology Progress Note        Sheffield Spotted Patient Status:  Observation    1945 MRN [id number]   [facility] 8NE-A Attending Jaymie Butts MD   [facility] Day # 6 PCP DO Gina Miller JVP normal  Cardiac: tachy. irregular rhythm, S1, S2 normal,  rub or gallop. No murmur. Lungs: decreased at bases  Abdomen: Soft, non-tender, hypoactive bowel sounds  Extremities: generalized edema, SCDs bilaterally  Neurologic: intact.   Skin: Warm and proximal left circ and mid RCA with Xience stents 4/11.  Normal cardiolite prior.  Normal cardiolite 2/13. Exertional angina with normal clite 7/18.  Cath 7/30/18 with ostial LCx. PCI 8/7/18 - laser atherectomy, cutting balloon, stenting (3.0X15 mm).   -1

## 2019-04-28 NOTE — PROGRESS NOTES
[facility]   [facility] Progress Note    Coby President Patient Status:  Inpatient    1945 MRN [id number]   [facility] 6NE-A Attending Shaun Corrales MD   [facility] Day # 6 PCP Cookie Reid,      Subjective:    Feels good up to [facility] 5 MG/5ML injection 5 mg 5 mg Intravenous Q4H PRN   ipratropium-albuterol (DUONEB) nebulizer solution 3 mL 3 mL Nebulization Q4H PRN   Rosuvastatin Calcium (CRESTOR) tab 5 mg 5 mg Oral Nightly   HYDROcodone-acetaminophen (NORCO)  MG per tab 2 tablet 2 Application Nasal BID   glucose (DEX4) oral liquid 15 g 15 g Oral Q15 Min PRN   Or      Glucose-Vitamin C (DEX-4) 4-6 GM-MG chewable tab 4 tablet 4 tablet Oral Q15 Min PRN   Or      dextrose 50 % injection 50 mL 50 mL Intravenous Q15 Min PRN   Or      gluc

## 2019-04-28 NOTE — PROGRESS NOTES
[facility]  Progress Note    Shaila Hernandez Patient Status:  Inpatient    1945 MRN [id number]   [facility] 6NE-A Attending Ishmael Weathers MD   [facility] Day # 6 PCP Carlton Fields DO       SUBJECTIVE:  Afib last night Encounters:  04/28/19 : 211 lb 6.7 oz (95.9 kg)  03/04/19 : 210 lb (95.3 kg)  01/15/19 : 205 lb (93 kg)    Constitutional Vital signs in last 24 hours:/64   Pulse 80   Temp 98.8 °F (37.1 °C) (Temporal)   Resp 25   Ht 67\"   Wt 211 lb 6.7 oz (95.9 kg) Q1H PRN   Or      morphINE sulfate (PF) 4 MG/ML injection 4 mg 4 mg Intravenous Q1H PRN   Or      morphINE sulfate (PF) 4 MG/ML injection 8 mg 8 mg Intravenous Q1H PRN   temazepam (RESTORIL) cap 15 mg 15 mg Oral Nightly PRN   Albumin Human (ALBUMINAR) 5 % GM-MG chewable tab 8 tablet 8 tablet Oral Q15 Min PRN   ceFAZolin sodium (ANCEF/KEFZOL) 2 GM/20ML premix IV syringe   PRN   metoprolol Tartrate (LOPRESSOR) tab 25 mg 25 mg Oral 2x Daily(Beta Blocker)   Senna (SENOKOT) tab 8.6 mg 8.6 mg Oral BID   aspirin E

## 2019-04-29 PROCEDURE — 97535 SELF CARE MNGMENT TRAINING: CPT

## 2019-04-29 PROCEDURE — 85025 COMPLETE CBC W/AUTO DIFF WBC: CPT | Performed by: INTERNAL MEDICINE

## 2019-04-29 PROCEDURE — 93010 ELECTROCARDIOGRAM REPORT: CPT | Performed by: INTERNAL MEDICINE

## 2019-04-29 PROCEDURE — 82962 GLUCOSE BLOOD TEST: CPT

## 2019-04-29 PROCEDURE — 97110 THERAPEUTIC EXERCISES: CPT

## 2019-04-29 PROCEDURE — 97116 GAIT TRAINING THERAPY: CPT

## 2019-04-29 PROCEDURE — 80048 BASIC METABOLIC PNL TOTAL CA: CPT | Performed by: INTERNAL MEDICINE

## 2019-04-29 PROCEDURE — 93005 ELECTROCARDIOGRAM TRACING: CPT

## 2019-04-29 RX ORDER — FUROSEMIDE 10 MG/ML
20 INJECTION INTRAMUSCULAR; INTRAVENOUS
Status: DISCONTINUED | OUTPATIENT
Start: 2019-04-29 | End: 2019-04-30

## 2019-04-29 RX ORDER — POTASSIUM CHLORIDE 20 MEQ/1
40 TABLET, EXTENDED RELEASE ORAL EVERY 4 HOURS
Status: COMPLETED | OUTPATIENT
Start: 2019-04-29 | End: 2019-04-29

## 2019-04-29 RX ORDER — POTASSIUM CHLORIDE 20 MEQ/1
40 TABLET, EXTENDED RELEASE ORAL ONCE
Status: DISCONTINUED | OUTPATIENT
Start: 2019-04-29 | End: 2019-04-29

## 2019-04-29 NOTE — OCCUPATIONAL THERAPY NOTE
OCCUPATIONAL THERAPY TREATMENT NOTE - INPATIENT     Room Number: 5114/0031-B  Session: 1  Number of Visits to Meet Established Goals: 4    Presenting Problem: acute chest pain    History related to current admission: Pt was experiencing chest pain and is n None  -   Toileting, which includes using toilet, bedpan or urinal? : None  -   Putting on and taking off regular upper body clothing?: None  -   Taking care of personal grooming such as brushing teeth?: None  -   Eating meals?: None    AM-PAC Score:  Scor Potential : Good  Frequency (Obs): Daily      OT Goals:   ADL Goals   Patient will perform lower body dressing:  independently  Patient will perform toileting: independently     Functional Transfer Goals  Patient will transfer to toilet:  independently  Pa incorporated into ADLs with no cues.  met

## 2019-04-29 NOTE — PLAN OF CARE
PLAN OF CARE - SHIFT NOTE    Patient is ANOx4, anxious/irritable at times, on RA , tele NSR, cont b/b, UAL. Patient is POD#3 from CABGx3. Pacer wires covered and protected.  Midsternal incision painted with betadine, steri-strips in place, well-approximated Evaluate effectiveness of vasoactive medications to optimize hemodynamic stability  - Monitor arterial and/or venous puncture sites for bleeding and/or hematoma  - Assess quality of pulses, skin color and temperature  - Assess for signs of decreased corona wounds(s) or drain site(s) healing without S/S of infection  Description  INTERVENTIONS:  - Assess and document risk factors for pressure ulcer development  - Assess and document skin integrity  - Assess and document dressing/incision, wound bed, drain sit consults as needed  - Advance activity as appropriate  - Communicate ordered activity level and limitations with patient/family  Outcome: Adequate for Discharge     Problem: Impaired Activities of Daily Living  Goal: Achieve highest/safest level of indepen

## 2019-04-29 NOTE — DIETARY NOTE
[address] [facility]     Admitting diagnosis:  Acute chest pain [R07.9]    Ht: 170.2 cm (5' 7\")  Wt: 95.8 kg (211 lb 3.2 oz). This is 143 % of IBW  Body mass index is 33.08 kg/m².   IBW: 67.2kg    Labs/Meds reviewed

## 2019-04-29 NOTE — PROGRESS NOTES
[facility]  CV Surgery Progress Note    Francisco J Nelson Patient Status:  Inpatient    1945 MRN [id number]   [facility] 8NE-A Attending Oren Dumont MD   [facility] Day # 7 PCP Олег Love DO     Subjective:  Patient see

## 2019-04-29 NOTE — PHYSICAL THERAPY NOTE
PHYSICAL THERAPY TREATMENT NOTE - INPATIENT    Room Number: 2960/7353-U     Session: 1   Number of Visits to Meet Established Goals: 5     History related to current admission: Pt is a 72 y.o. Male admitted 4/21/19 with chest pain secondary to NSTEMI.  Pt O2 WALK     SPO2 Ambulation on Room Air: 94              AM-PAC '6-Clicks' INPATIENT SHORT FORM - BASIC MOBILITY  How much difficulty does the patient currently have. ..  -   Turning over in bed (including adjusting bedclothes, sheets and blank Standing     Repetitions   5     Sets   1     Patient End of Session: In bed;Needs met;Call light within reach;RN aware of session/findings; All patient questions and concerns addressed;SCDs in place    ASSESSMENT   Patient with significantly improved geo

## 2019-04-29 NOTE — PROGRESS NOTES
[facility] Hospitalist Progress Note     [facility]      SUBJECTIVE:  Doing ok, pain controlled.   In afib briefly this am.      OBJECTIVE:  Temp:  [98 °F (36.7 °C)-99.3 °F (37.4 °C)] 98.5 °F (36.9 °C)  Pulse:  [76-99] 80  Resp:  [18-24] 18  BP: (108-136)/( Chest Ap Portable  (cpt=71045)    Result Date: 4/21/2019  PROCEDURE:  XR CHEST AP PORTABLE  (CPT=71045)  TECHNIQUE:  AP chest radiograph was obtained. COMPARISON:  None.   INDICATIONS:  substernal chest pain awoke him at 0215, denies SOB, Nausea, or radiat Intravenous Q1H PRN   temazepam (RESTORIL) cap 15 mg 15 mg Oral Nightly PRN   Albumin Human (ALBUMINAR) 5 % solution 250 mL 250 mL Intravenous Once PRN   DOBUTamine in D5W (DOBUTREX) 500 mg/250 ml infusion 2.5-40 mcg/kg/min Intravenous Continuous PRN   nit (SENOKOT) tab 8.6 mg 8.6 mg Oral BID   aspirin EC tab 81 mg 81 mg Oral Nightly        Assessment/Plan:      Mr. Byron Melissa is a 71 yo male with PMH of CAD s/p prior PCI (8/7/18) and POBA (1/11/19), HTN, HLD, migraines who presented to the ED with chest pressur

## 2019-04-29 NOTE — PROGRESS NOTES
[facility]  Progress Note    Amrita Sal Patient Status:  Inpatient    1945 MRN [id number]   [facility] 6NE-A Attending Trino Bahena MD   [facility] Day # 7 PCP Klaeb Villaseñor DO       SUBJECTIVE:  Afib last night Encounters:  04/29/19 : 211 lb 3.2 oz (95.8 kg)  03/04/19 : 210 lb (95.3 kg)  01/15/19 : 205 lb (93 kg)    Constitutional Vital signs in last 24 hours:/69 (BP Location: Right arm)   Pulse 80   Temp 98.8 °F (37.1 °C) (Oral)   Resp 18   Ht 67\"   Wt 21 5 mg Intravenous Q4H PRN   ipratropium-albuterol (DUONEB) nebulizer solution 3 mL 3 mL Nebulization Q4H PRN   Rosuvastatin Calcium (CRESTOR) tab 5 mg 5 mg Oral Nightly   HYDROcodone-acetaminophen (NORCO)  MG per tab 2 tablet 2 tablet Oral Q4H PRN   m glucose (DEX4) oral liquid 15 g 15 g Oral Q15 Min PRN   Or      Glucose-Vitamin C (DEX-4) 4-6 GM-MG chewable tab 4 tablet 4 tablet Oral Q15 Min PRN   Or      dextrose 50 % injection 50 mL 50 mL Intravenous Q15 Min PRN   Or      glucose (DEX4) oral liquid

## 2019-04-29 NOTE — CM/SW NOTE
[facility] has accepted patient. Chayito King, [address] [facility] /Dischage Planner  [phone]  Pager [phone]

## 2019-04-29 NOTE — PROGRESS NOTES
[facility] Cardiology Progress Note        Aileen Cabrera Patient Status:  Observation    1945 MRN [id number]   [facility] 8NE-A Attending Radha Dominguez MD   [facility] Day # 7 PCP DO Ross Judd comfortable. HEENT: No focal deficits. Neck: supple. JVP normal  Cardiac: tachy. irregular rhythm, S1, S2 normal,  rub or gallop. No murmur.   Lungs: decreased at bases  Abdomen: Soft, non-tender, hypoactive bowel sounds  Extremities: generalized edema, proximal left circ and mid RCA with Xience stents 4/11.  Normal cardiolite prior.  Normal cardiolite 2/13. Exertional angina with normal clite 7/18.  Cath 7/30/18 with ostial LCx. PCI 8/7/18 - laser atherectomy, cutting balloon, stenting (3.0X15 mm).   -1

## 2019-04-29 NOTE — PLAN OF CARE
Afebrile. VSS. Tele SR.MS and left leg incisions clean, dry, intact well approximated. Move 1750mls with IS. Pacer wires removed and tolerated well. Up walking in halls and did stairs today. Denies pain. See EMR for further information.

## 2019-04-30 VITALS
TEMPERATURE: 98 F | SYSTOLIC BLOOD PRESSURE: 159 MMHG | RESPIRATION RATE: 18 BRPM | HEIGHT: 67 IN | WEIGHT: 208.31 LBS | DIASTOLIC BLOOD PRESSURE: 77 MMHG | OXYGEN SATURATION: 95 % | BODY MASS INDEX: 32.7 KG/M2 | HEART RATE: 78 BPM

## 2019-04-30 PROCEDURE — 80048 BASIC METABOLIC PNL TOTAL CA: CPT | Performed by: INTERNAL MEDICINE

## 2019-04-30 RX ORDER — HYDROCODONE BITARTRATE AND ACETAMINOPHEN 5; 325 MG/1; MG/1
1 TABLET ORAL
Qty: 60 TABLET | Refills: 0 | Status: SHIPPED | OUTPATIENT
Start: 2019-04-30 | End: 2019-11-05

## 2019-04-30 RX ORDER — AMIODARONE HYDROCHLORIDE 200 MG/1
200 TABLET ORAL 2 TIMES DAILY WITH MEALS
Status: DISCONTINUED | OUTPATIENT
Start: 2019-04-30 | End: 2019-04-30

## 2019-04-30 RX ORDER — AMIODARONE HYDROCHLORIDE 200 MG/1
200 TABLET ORAL 2 TIMES DAILY WITH MEALS
Status: DISCONTINUED | OUTPATIENT
Start: 2019-05-01 | End: 2019-04-30

## 2019-04-30 RX ORDER — FUROSEMIDE 20 MG/1
20 TABLET ORAL DAILY
Qty: 30 TABLET | Refills: 5 | Status: SHIPPED | OUTPATIENT
Start: 2019-05-01 | End: 2019-06-21

## 2019-04-30 RX ORDER — AMIODARONE HYDROCHLORIDE 200 MG/1
200 TABLET ORAL 2 TIMES DAILY WITH MEALS
Qty: 60 TABLET | Refills: 5 | Status: SHIPPED | OUTPATIENT
Start: 2019-05-01 | End: 2019-05-09

## 2019-04-30 RX ORDER — FUROSEMIDE 10 MG/ML
40 INJECTION INTRAMUSCULAR; INTRAVENOUS ONCE
Status: COMPLETED | OUTPATIENT
Start: 2019-04-30 | End: 2019-04-30

## 2019-04-30 RX ORDER — FUROSEMIDE 20 MG/1
20 TABLET ORAL DAILY
Status: DISCONTINUED | OUTPATIENT
Start: 2019-05-01 | End: 2019-04-30

## 2019-04-30 NOTE — DISCHARGE SUMMARY
General Medicine Discharge Summary     Patient ID:  Jennifer Jim  72year old  6/22/1945    Admit date: 4/21/2019    Discharge date and time: 4/30/2019    Attending Physician: Latoya Hammer CONSULT TO HOSPITALIST  IP CONSULT TO CARDIOLOGY  IP CONSULT TO CARDIAC REHAB  IP CONSULT TO FOOD AND NUTRITION SERVICES  IP CONSULT TO PREVENTION & EDUCATION  IP CONSULT TO SOCIAL WORK  IP CONSULT TO POST OPEN HEART SURGERY DIABETES SPECIALIST TEAM  IP [facility]  [phone]

## 2019-04-30 NOTE — PLAN OF CARE
Problem: Patient/Family Goals  Goal: Patient/Family Long Term Goal  Description  Patient's Long Term Goal: resolve chest pain and stay out of hospital.    Interventions:  - take prescribed rx, follow up with physicians, healthy diet, exercise as tolerate replacements, including rhythm and repeat lab results as appropriate  - Fluid restriction as ordered  - Instruct patient on fluid and nutrition restrictions as appropriate  Outcome: Progressing  Goal: Hemodynamic stability and optimal renal function mainta sites to achieve adequate hemostasis  - Assess for signs and symptoms of internal bleeding  - Monitor lab trends  Outcome: Progressing     Problem: MUSCULOSKELETAL - ADULT  Goal: Return mobility to safest level of function  Description  INTERVENTIONS:  - A clear and diminished. Encouraged IS use. Pt. Has NSR. Pt. Received Bacteroban (see MAR). Pt. Has sternal incision that is C/D/I with no drainage. Site was painted with Betadine. Pt. Has a R groin and L leg donor site.  Both sites are C/D/I with no drainage

## 2019-04-30 NOTE — PROGRESS NOTES
NURSING DISCHARGE NOTE    Discharged Home via Wheelchair. Accompanied by Spouse  Belongings Taken by patient/family. Pt PIV removed with catheter intact and tele discontinued. Pt received discharge instructions and follow up information.  Medication

## 2019-04-30 NOTE — PROGRESS NOTES
[facility] Card[facility] Progress Note        Maria Teresa Devi Patient Status:  Observation    1945 MRN [id number]   [facility] 8NE-A Attending Jorge Schwartz MD   [facility] Day # 8 PCP DO Scott Saucedo comfortable. HEENT: No focal deficits. Neck: supple. JVP normal  Cardiac: tachy. irregular rhythm, S1, S2 normal,  rub or gallop. No murmur.   Lungs: decreased at bases  Abdomen: Soft, non-tender, hypoactive bowel sounds  Extremities: generalized edema, and mid RCA with Xience stents 4/11.  Normal cardiolite prior.  Normal cardiolite 2/13. Exertional angina with normal clite 7/18.  Cath 7/30/18 with ostial LCx. PCI 8/7/18 - laser atherectomy, cutting balloon, stenting (3.0X15 mm).   -1/10/19 - accelerati

## 2019-04-30 NOTE — PROGRESS NOTES
Met with patient and wife to discuss discharge instructions, activity restrictions and recommendations, follow up visits, all questions answered, encouraged to call with any questions or concerns.    Tess Kramer RN  Clinical Coordinator  CV Surgery

## 2019-04-30 NOTE — PROGRESS NOTES
[facility]  CV Surgery Progress Note    Maria Teresa Devi Patient Status:  Inpatient    1945 MRN [id number]   [facility] 8NE-A Attending Lev Perdomo MD   [facility] Day # 8 PCP Reyna Mackay DO     Subjective:  Patient see

## 2019-04-30 NOTE — PLAN OF CARE
Problem: Patient/Family Goals  Goal: Patient/Family Long Term Goal  Description  Patient's Long Term Goal: resolve chest pain and stay out of hospital.    Interventions:  - take prescribed rx, follow up with physicians, healthy diet, exercise as tolerate Gail Caraballo RN  Outcome: Progressing  4/30/2019 1104 by Gail Caraballo RN  Outcome: Progressing     Problem: METABOLIC/FLUID AND ELECTROLYTES - ADULT  Goal: Electrolytes maintained within normal limits  Description  INTERVENTIONS:  - Monitor l RN  Outcome: Progressing  4/30/2019 1104 by Layla Malik RN  Outcome: Progressing     Problem: HEMATOLOGIC - ADULT  Goal: Maintains hematologic stability  Description  INTERVENTIONS  - Assess for signs and symptoms of bleeding or hemorrhage  - Monit increasing activity/tolerance for mobility and gait  - Educate and engage patient/family in tolerated activity level and precautions  - ambulate on unit 3-4X/day      4/30/2019 1104 by Azra Alan RN  Outcome: Progressing  4/30/2019 1104 by Kathy Sylvester

## 2019-04-30 NOTE — PROCEDURES
[facility]    PATIENT'S NAME: Brian Markham   ATTENDING PHYSICIAN: Alexa Mcdermott M.D. OPERATING PHYSICIAN: Isabela Kramer.  Simi Sanchez MD   PATIENT ACCOUNT#:   [id number]    LOCATION:  8NE-A 86[address] [facility]  MEDICAL RECORD #:   [id number]       DATE OF B to distal vessel has only mild to at most moderate disease with a 40% plaque noted. Circumflex is a moderate to large system with a moderate-caliber obtuse marginal.  At the ostium, there is 99% in-stent restenosis.   There is then a 50% to 60% calcified

## 2019-05-02 PROBLEM — Z95.1 S/P CABG (CORONARY ARTERY BYPASS GRAFT): Status: ACTIVE | Noted: 2019-05-02

## 2019-05-02 NOTE — CDS QUERY
Adry Stephens  Male, 72year old, 6/22/1945 VisitID: [id number]   CSN:  [id number]  MRN: [id number]    Clarification – Procedure Documentation   Henna Ornelas  Dear Doctor:  Clinical information in the patient's medical re

## 2019-05-03 ENCOUNTER — TELEPHONE (OUTPATIENT)
Dept: CARDIOLOGY UNIT | Facility: HOSPITAL | Age: 74
End: 2019-05-03

## 2019-05-03 NOTE — PROGRESS NOTES
Follow Up Phone Call    1. How are you doing now that you are home? \"I'm surprised about the good days and bad days that you guys all talked about because I'm experiencing that. \"    2. Have there been any changes in your wound/incision since going home? \"No issues. \"    3. Is your pain manageable at home? \"I'm trying not to take the narcotics. I might start taking them or one at night. I try alternative therapies though, like heating pads. \"    4. Are you following the walking routine given to you in the hospital? \"Yes, and PT comes to the house as well. \"    5. Are you continuing to use your incentive spirometer? \"Up to 2,600\"    6. Do you have your appointments for Chest Xray?  5/9/19 @ 0945/1015                                                              Primary MD? 5/2/19 w/Dr. Greg Blas                                                              Cardiologist? 5/9/19 @ 1120 w/Twila Nathan? Anyi 5/22/19 @ 1200                                                              Cardiac Rehab?  5/28/19 @ 1000    7. Do you have any other questions or concerns today?  \"No.\"        Jeremy Harrison  5/3/2019  10:51 AM

## 2019-05-09 ENCOUNTER — HOSPITAL ENCOUNTER (OUTPATIENT)
Dept: GENERAL RADIOLOGY | Facility: HOSPITAL | Age: 74
Discharge: HOME | End: 2019-05-09
Attending: THORACIC SURGERY (CARDIOTHORACIC VASCULAR SURGERY)
Payer: MEDICARE

## 2019-05-09 DIAGNOSIS — Z98.890 HISTORY OF OPEN HEART SURGERY: ICD-10-CM

## 2019-05-09 PROCEDURE — 71048 X-RAY EXAM CHEST 4+ VIEWS: CPT | Performed by: THORACIC SURGERY (CARDIOTHORACIC VASCULAR SURGERY)

## 2019-05-20 ENCOUNTER — HOSPITAL ENCOUNTER (OUTPATIENT)
Dept: GENERAL RADIOLOGY | Facility: HOSPITAL | Age: 74
Discharge: HOME | End: 2019-05-20
Attending: PHYSICIAN ASSISTANT
Payer: MEDICARE

## 2019-05-20 DIAGNOSIS — J91.8 PLEURAL EFFUSION IN OTHER CONDITIONS CLASSIFIED ELSEWHERE: ICD-10-CM

## 2019-05-20 PROCEDURE — 71048 X-RAY EXAM CHEST 4+ VIEWS: CPT | Performed by: PHYSICIAN ASSISTANT

## 2019-05-28 ENCOUNTER — CARDPULM VISIT (OUTPATIENT)
Dept: CARDIAC REHAB | Facility: HOSPITAL | Age: 74
End: 2019-05-28
Attending: INTERNAL MEDICINE
Payer: MEDICARE

## 2019-05-28 PROCEDURE — 93798 PHYS/QHP OP CAR RHAB W/ECG: CPT

## 2019-05-29 ENCOUNTER — CARDPULM VISIT (OUTPATIENT)
Dept: CARDIAC REHAB | Facility: HOSPITAL | Age: 74
End: 2019-05-29
Attending: INTERNAL MEDICINE
Payer: MEDICARE

## 2019-05-29 PROCEDURE — 93798 PHYS/QHP OP CAR RHAB W/ECG: CPT

## 2019-05-31 ENCOUNTER — CARDPULM VISIT (OUTPATIENT)
Dept: CARDIAC REHAB | Facility: HOSPITAL | Age: 74
End: 2019-05-31
Attending: INTERNAL MEDICINE
Payer: MEDICARE

## 2019-05-31 PROCEDURE — 93798 PHYS/QHP OP CAR RHAB W/ECG: CPT

## 2019-06-03 ENCOUNTER — CARDPULM VISIT (OUTPATIENT)
Dept: CARDIAC REHAB | Facility: HOSPITAL | Age: 74
End: 2019-06-03
Attending: INTERNAL MEDICINE
Payer: MEDICARE

## 2019-06-03 PROCEDURE — 93798 PHYS/QHP OP CAR RHAB W/ECG: CPT

## 2019-06-05 ENCOUNTER — CARDPULM VISIT (OUTPATIENT)
Dept: CARDIAC REHAB | Facility: HOSPITAL | Age: 74
End: 2019-06-05
Attending: INTERNAL MEDICINE
Payer: MEDICARE

## 2019-06-05 ENCOUNTER — HOSPITAL ENCOUNTER (OUTPATIENT)
Dept: GENERAL RADIOLOGY | Facility: HOSPITAL | Age: 74
Discharge: HOME | End: 2019-06-05
Attending: THORACIC SURGERY (CARDIOTHORACIC VASCULAR SURGERY)
Payer: MEDICARE

## 2019-06-05 DIAGNOSIS — Z98.890 HISTORY OF OPEN HEART SURGERY: ICD-10-CM

## 2019-06-05 PROCEDURE — 71048 X-RAY EXAM CHEST 4+ VIEWS: CPT | Performed by: THORACIC SURGERY (CARDIOTHORACIC VASCULAR SURGERY)

## 2019-06-05 PROCEDURE — 93798 PHYS/QHP OP CAR RHAB W/ECG: CPT

## 2019-06-07 ENCOUNTER — CARDPULM VISIT (OUTPATIENT)
Dept: CARDIAC REHAB | Facility: HOSPITAL | Age: 74
End: 2019-06-07
Attending: INTERNAL MEDICINE
Payer: MEDICARE

## 2019-06-07 PROCEDURE — 93798 PHYS/QHP OP CAR RHAB W/ECG: CPT

## 2019-06-10 ENCOUNTER — CARDPULM VISIT (OUTPATIENT)
Dept: CARDIAC REHAB | Facility: HOSPITAL | Age: 74
End: 2019-06-10
Attending: INTERNAL MEDICINE
Payer: MEDICARE

## 2019-06-10 PROCEDURE — 93798 PHYS/QHP OP CAR RHAB W/ECG: CPT

## 2019-06-12 ENCOUNTER — CARDPULM VISIT (OUTPATIENT)
Dept: CARDIAC REHAB | Facility: HOSPITAL | Age: 74
End: 2019-06-12
Attending: INTERNAL MEDICINE
Payer: MEDICARE

## 2019-06-12 PROCEDURE — 93798 PHYS/QHP OP CAR RHAB W/ECG: CPT

## 2019-06-14 ENCOUNTER — CARDPULM VISIT (OUTPATIENT)
Dept: CARDIAC REHAB | Facility: HOSPITAL | Age: 74
End: 2019-06-14
Attending: INTERNAL MEDICINE
Payer: MEDICARE

## 2019-06-14 PROCEDURE — 93798 PHYS/QHP OP CAR RHAB W/ECG: CPT

## 2019-06-17 ENCOUNTER — APPOINTMENT (OUTPATIENT)
Dept: CARDIAC REHAB | Facility: HOSPITAL | Age: 74
End: 2019-06-17
Attending: INTERNAL MEDICINE
Payer: MEDICARE

## 2019-06-19 ENCOUNTER — CARDPULM VISIT (OUTPATIENT)
Dept: CARDIAC REHAB | Facility: HOSPITAL | Age: 74
End: 2019-06-19
Attending: INTERNAL MEDICINE
Payer: MEDICARE

## 2019-06-19 PROCEDURE — 93798 PHYS/QHP OP CAR RHAB W/ECG: CPT

## 2019-06-21 ENCOUNTER — CARDPULM VISIT (OUTPATIENT)
Dept: CARDIAC REHAB | Facility: HOSPITAL | Age: 74
End: 2019-06-21
Attending: INTERNAL MEDICINE
Payer: MEDICARE

## 2019-06-21 PROCEDURE — 93798 PHYS/QHP OP CAR RHAB W/ECG: CPT

## 2019-06-24 ENCOUNTER — CARDPULM VISIT (OUTPATIENT)
Dept: CARDIAC REHAB | Facility: HOSPITAL | Age: 74
End: 2019-06-24
Attending: INTERNAL MEDICINE
Payer: MEDICARE

## 2019-06-24 PROCEDURE — 93798 PHYS/QHP OP CAR RHAB W/ECG: CPT

## 2019-06-26 ENCOUNTER — CARDPULM VISIT (OUTPATIENT)
Dept: CARDIAC REHAB | Facility: HOSPITAL | Age: 74
End: 2019-06-26
Attending: INTERNAL MEDICINE
Payer: MEDICARE

## 2019-06-26 PROCEDURE — 93798 PHYS/QHP OP CAR RHAB W/ECG: CPT

## 2019-06-26 RX ORDER — FUROSEMIDE 20 MG/1
20 TABLET ORAL
COMMUNITY
End: 2019-07-19

## 2019-06-28 ENCOUNTER — CARDPULM VISIT (OUTPATIENT)
Dept: CARDIAC REHAB | Facility: HOSPITAL | Age: 74
End: 2019-06-28
Attending: INTERNAL MEDICINE
Payer: MEDICARE

## 2019-06-28 PROCEDURE — 93798 PHYS/QHP OP CAR RHAB W/ECG: CPT

## 2019-07-01 ENCOUNTER — APPOINTMENT (OUTPATIENT)
Dept: CARDIAC REHAB | Facility: HOSPITAL | Age: 74
End: 2019-07-01
Attending: INTERNAL MEDICINE
Payer: MEDICARE

## 2019-07-03 ENCOUNTER — CARDPULM VISIT (OUTPATIENT)
Dept: CARDIAC REHAB | Facility: HOSPITAL | Age: 74
End: 2019-07-03
Attending: INTERNAL MEDICINE
Payer: MEDICARE

## 2019-07-03 PROCEDURE — 93798 PHYS/QHP OP CAR RHAB W/ECG: CPT

## 2019-07-05 ENCOUNTER — CARDPULM VISIT (OUTPATIENT)
Dept: CARDIAC REHAB | Facility: HOSPITAL | Age: 74
End: 2019-07-05
Attending: INTERNAL MEDICINE
Payer: MEDICARE

## 2019-07-05 PROCEDURE — 93798 PHYS/QHP OP CAR RHAB W/ECG: CPT

## 2019-07-08 ENCOUNTER — CARDPULM VISIT (OUTPATIENT)
Dept: CARDIAC REHAB | Facility: HOSPITAL | Age: 74
End: 2019-07-08
Attending: INTERNAL MEDICINE
Payer: MEDICARE

## 2019-07-08 PROCEDURE — 93798 PHYS/QHP OP CAR RHAB W/ECG: CPT

## 2019-07-10 ENCOUNTER — CARDPULM VISIT (OUTPATIENT)
Dept: CARDIAC REHAB | Facility: HOSPITAL | Age: 74
End: 2019-07-10
Attending: INTERNAL MEDICINE
Payer: MEDICARE

## 2019-07-10 PROCEDURE — 93798 PHYS/QHP OP CAR RHAB W/ECG: CPT

## 2019-07-12 ENCOUNTER — APPOINTMENT (OUTPATIENT)
Dept: CARDIAC REHAB | Facility: HOSPITAL | Age: 74
End: 2019-07-12
Attending: INTERNAL MEDICINE
Payer: MEDICARE

## 2019-07-15 ENCOUNTER — CARDPULM VISIT (OUTPATIENT)
Dept: CARDIAC REHAB | Facility: HOSPITAL | Age: 74
End: 2019-07-15
Attending: INTERNAL MEDICINE
Payer: MEDICARE

## 2019-07-15 PROCEDURE — 93798 PHYS/QHP OP CAR RHAB W/ECG: CPT

## 2019-07-17 ENCOUNTER — APPOINTMENT (OUTPATIENT)
Dept: CARDIAC REHAB | Facility: HOSPITAL | Age: 74
End: 2019-07-17
Attending: INTERNAL MEDICINE
Payer: MEDICARE

## 2019-07-19 ENCOUNTER — APPOINTMENT (OUTPATIENT)
Dept: CARDIAC REHAB | Facility: HOSPITAL | Age: 74
End: 2019-07-19
Attending: INTERNAL MEDICINE
Payer: MEDICARE

## 2019-07-22 ENCOUNTER — APPOINTMENT (OUTPATIENT)
Dept: CARDIAC REHAB | Facility: HOSPITAL | Age: 74
End: 2019-07-22
Attending: INTERNAL MEDICINE
Payer: MEDICARE

## 2019-07-24 ENCOUNTER — APPOINTMENT (OUTPATIENT)
Dept: CARDIAC REHAB | Facility: HOSPITAL | Age: 74
End: 2019-07-24
Attending: INTERNAL MEDICINE
Payer: MEDICARE

## 2019-07-26 ENCOUNTER — APPOINTMENT (OUTPATIENT)
Dept: CARDIAC REHAB | Facility: HOSPITAL | Age: 74
End: 2019-07-26
Attending: INTERNAL MEDICINE
Payer: MEDICARE

## 2019-07-29 ENCOUNTER — APPOINTMENT (OUTPATIENT)
Dept: CARDIAC REHAB | Facility: HOSPITAL | Age: 74
End: 2019-07-29
Attending: INTERNAL MEDICINE
Payer: MEDICARE

## 2019-07-31 ENCOUNTER — APPOINTMENT (OUTPATIENT)
Dept: CARDIAC REHAB | Facility: HOSPITAL | Age: 74
End: 2019-07-31
Attending: INTERNAL MEDICINE
Payer: MEDICARE

## 2019-08-02 ENCOUNTER — APPOINTMENT (OUTPATIENT)
Dept: CARDIAC REHAB | Facility: HOSPITAL | Age: 74
End: 2019-08-02
Attending: INTERNAL MEDICINE
Payer: MEDICARE

## 2019-08-05 ENCOUNTER — APPOINTMENT (OUTPATIENT)
Dept: CARDIAC REHAB | Facility: HOSPITAL | Age: 74
End: 2019-08-05
Attending: INTERNAL MEDICINE
Payer: MEDICARE

## 2019-08-07 ENCOUNTER — APPOINTMENT (OUTPATIENT)
Dept: CARDIAC REHAB | Facility: HOSPITAL | Age: 74
End: 2019-08-07
Attending: INTERNAL MEDICINE
Payer: MEDICARE

## 2019-08-09 ENCOUNTER — APPOINTMENT (OUTPATIENT)
Dept: CARDIAC REHAB | Facility: HOSPITAL | Age: 74
End: 2019-08-09
Attending: INTERNAL MEDICINE
Payer: MEDICARE

## 2019-08-12 ENCOUNTER — APPOINTMENT (OUTPATIENT)
Dept: CARDIAC REHAB | Facility: HOSPITAL | Age: 74
End: 2019-08-12
Attending: INTERNAL MEDICINE
Payer: MEDICARE

## 2019-08-14 ENCOUNTER — APPOINTMENT (OUTPATIENT)
Dept: CARDIAC REHAB | Facility: HOSPITAL | Age: 74
End: 2019-08-14
Attending: INTERNAL MEDICINE
Payer: MEDICARE

## 2019-08-16 ENCOUNTER — APPOINTMENT (OUTPATIENT)
Dept: CARDIAC REHAB | Facility: HOSPITAL | Age: 74
End: 2019-08-16
Attending: INTERNAL MEDICINE
Payer: MEDICARE

## 2019-08-19 ENCOUNTER — APPOINTMENT (OUTPATIENT)
Dept: CARDIAC REHAB | Facility: HOSPITAL | Age: 74
End: 2019-08-19
Attending: INTERNAL MEDICINE
Payer: MEDICARE

## 2019-08-21 ENCOUNTER — APPOINTMENT (OUTPATIENT)
Dept: CARDIAC REHAB | Facility: HOSPITAL | Age: 74
End: 2019-08-21
Attending: INTERNAL MEDICINE
Payer: MEDICARE

## 2019-08-23 ENCOUNTER — APPOINTMENT (OUTPATIENT)
Dept: CARDIAC REHAB | Facility: HOSPITAL | Age: 74
End: 2019-08-23
Attending: INTERNAL MEDICINE
Payer: MEDICARE

## 2019-08-26 ENCOUNTER — APPOINTMENT (OUTPATIENT)
Dept: CARDIAC REHAB | Facility: HOSPITAL | Age: 74
End: 2019-08-26
Attending: INTERNAL MEDICINE
Payer: MEDICARE

## 2020-02-06 PROBLEM — D69.6 THROMBOCYTOPENIA, UNSPECIFIED: Status: ACTIVE | Noted: 2020-02-06

## 2020-02-06 PROBLEM — I20.1 ANGINA PECTORIS WITH DOCUMENTED SPASM (HCC): Status: ACTIVE | Noted: 2020-02-06

## 2020-02-06 PROBLEM — D69.6 THROMBOCYTOPENIA, UNSPECIFIED (HCC): Status: ACTIVE | Noted: 2020-02-06

## 2020-02-06 PROBLEM — IMO0001 MILD AORTIC SCLEROSIS: Status: ACTIVE | Noted: 2020-02-06

## 2020-02-06 PROBLEM — I20.0 UNSTABLE ANGINA (HCC): Status: RESOLVED | Noted: 2019-01-11 | Resolved: 2020-02-06

## 2020-02-06 PROBLEM — I20.1 ANGINA PECTORIS WITH DOCUMENTED SPASM: Status: ACTIVE | Noted: 2020-02-06

## 2020-02-06 PROBLEM — R07.9 ACUTE CHEST PAIN: Status: RESOLVED | Noted: 2019-04-21 | Resolved: 2020-02-06

## 2020-02-06 PROBLEM — I20.1 ANGINA PECTORIS WITH DOCUMENTED SPASM (HCC): Status: RESOLVED | Noted: 2020-02-06 | Resolved: 2020-02-06

## 2020-02-06 PROBLEM — I20.1 ANGINA PECTORIS WITH DOCUMENTED SPASM: Status: RESOLVED | Noted: 2020-02-06 | Resolved: 2020-02-06

## 2020-02-06 PROBLEM — I48.0 PAROXYSMAL ATRIAL FIBRILLATION (HCC): Status: ACTIVE | Noted: 2020-02-06

## 2021-02-23 PROBLEM — I70.0 ATHEROSCLEROSIS OF AORTA: Status: ACTIVE | Noted: 2021-02-23

## 2021-02-23 PROBLEM — IMO0001 MILD AORTIC SCLEROSIS: Status: RESOLVED | Noted: 2020-02-06 | Resolved: 2021-02-23

## 2021-02-23 PROBLEM — I70.0 ATHEROSCLEROSIS OF AORTA (HCC): Status: ACTIVE | Noted: 2021-02-23

## 2021-06-18 NOTE — DIETARY NOTE
Nutrition Short Note    Dietitian consult received per cardiac rehab/CHF protocol. Pt to be educated by cardiac rehab staff and encouraged to attend outpatient classes taught by RD. RD available PRN.     Samy Alonso MS, RD, LDN no

## 2025-07-31 RX ORDER — BENAZEPRIL HYDROCHLORIDE 40 MG/1
40 TABLET ORAL DAILY
COMMUNITY

## 2025-08-05 ENCOUNTER — HOSPITAL ENCOUNTER (OUTPATIENT)
Dept: INTERVENTIONAL RADIOLOGY/VASCULAR | Facility: HOSPITAL | Age: 80
Discharge: HOME | End: 2025-08-05
Attending: INTERNAL MEDICINE | Admitting: INTERNAL MEDICINE

## 2025-08-05 VITALS
BODY MASS INDEX: 32.49 KG/M2 | TEMPERATURE: 98 F | OXYGEN SATURATION: 96 % | RESPIRATION RATE: 20 BRPM | HEART RATE: 67 BPM | HEIGHT: 67 IN | DIASTOLIC BLOOD PRESSURE: 99 MMHG | WEIGHT: 207 LBS | SYSTOLIC BLOOD PRESSURE: 113 MMHG

## 2025-08-05 DIAGNOSIS — I25.10 CAD (CORONARY ARTERY DISEASE): ICD-10-CM

## 2025-08-05 PROCEDURE — 92978 ENDOLUMINL IVUS OCT C 1ST: CPT | Performed by: INTERNAL MEDICINE

## 2025-08-05 PROCEDURE — 93799 UNLISTED CV SVC/PROCEDURE: CPT | Performed by: INTERNAL MEDICINE

## 2025-08-05 PROCEDURE — 93459 L HRT ART/GRFT ANGIO: CPT | Performed by: INTERNAL MEDICINE

## 2025-08-05 PROCEDURE — 99152 MOD SED SAME PHYS/QHP 5/>YRS: CPT | Performed by: INTERNAL MEDICINE

## 2025-08-05 PROCEDURE — 99153 MOD SED SAME PHYS/QHP EA: CPT | Performed by: INTERNAL MEDICINE

## 2025-08-05 RX ORDER — MIDAZOLAM HYDROCHLORIDE 1 MG/ML
INJECTION INTRAMUSCULAR; INTRAVENOUS
Status: COMPLETED
Start: 2025-08-05 | End: 2025-08-05

## 2025-08-05 RX ORDER — HEPARIN SODIUM 5000 [USP'U]/ML
INJECTION, SOLUTION INTRAVENOUS; SUBCUTANEOUS
Status: COMPLETED
Start: 2025-08-05 | End: 2025-08-05

## 2025-08-05 RX ORDER — SODIUM CHLORIDE 9 MG/ML
INJECTION, SOLUTION INTRAVENOUS
Status: COMPLETED | OUTPATIENT
Start: 2025-08-05 | End: 2025-08-05

## 2025-08-05 RX ORDER — LIDOCAINE HYDROCHLORIDE 10 MG/ML
INJECTION, SOLUTION EPIDURAL; INFILTRATION; INTRACAUDAL; PERINEURAL
Status: COMPLETED
Start: 2025-08-05 | End: 2025-08-05

## 2025-08-05 RX ORDER — IOPAMIDOL 755 MG/ML
100 INJECTION, SOLUTION INTRAVASCULAR
Status: COMPLETED | OUTPATIENT
Start: 2025-08-05 | End: 2025-08-05

## 2025-08-05 RX ADMIN — IOPAMIDOL 170 ML: 755 INJECTION, SOLUTION INTRAVASCULAR at 09:00:00

## 2025-08-05 RX ADMIN — SODIUM CHLORIDE: 9 INJECTION, SOLUTION INTRAVENOUS at 07:25:00

## (undated) DEVICE — CELL SAVER 5/5+ BOWL KIT-225ML: Brand: HAEMONETICS CELL SAVER 5/5+ SYSTEMS

## (undated) DEVICE — Device

## (undated) DEVICE — CELL SAVER TUBING BRAT

## (undated) DEVICE — BLOWER CO2 MEDTRONIC/DLP 22150

## (undated) DEVICE — GLOVE SURG TRIUMPH SZ 8

## (undated) DEVICE — OPEN HEART: Brand: MEDLINE INDUSTRIES, INC.

## (undated) DEVICE — BLADE SW 32X6MM  STRNM

## (undated) DEVICE — SUTURE PROLENE 6-0 C-1

## (undated) DEVICE — TRAY ENDOVEIN KTV16 HARVESTING

## (undated) DEVICE — STERILE POLYISOPRENE POWDER-FREE SURGICAL GLOVES: Brand: PROTEXIS

## (undated) DEVICE — HEMOCLIP HORIZON LG 004200

## (undated) DEVICE — PDS II VLT 0 107CM AG ST3: Brand: ENDOLOOP

## (undated) DEVICE — SOL LACT RINGERS 1000ML

## (undated) DEVICE — SYRINGE 30ML LL TIP

## (undated) DEVICE — SUTURE WIRE DOUBLE STERNOTOMY

## (undated) DEVICE — SOL  .9 1000ML BTL

## (undated) DEVICE — GAMMEX® PI HYBRID SIZE 5.5, STERILE POWDER-FREE SURGICAL GLOVE, POLYISOPRENE AND NEOPRENE BLEND: Brand: GAMMEX

## (undated) DEVICE — SUTURE SILK 2-0

## (undated) DEVICE — GOWN,SIRUS,FAB REINF,RAGLAN,XL,STERILE: Brand: MEDLINE

## (undated) DEVICE — SUTURE POLYDEK 2-0

## (undated) DEVICE — SPONGE LAP 18X18 XRAY STRL

## (undated) DEVICE — CELL SAVER RESERVOIR BRAT

## (undated) NOTE — LETTER
[facility] [address], [address] [place] [address]    Consent for Anesthesia   1.    Abimael Escamilla agree to be cared for by an anesthesiologist, who is specially trained to monitor me and give me medicine to put me to sleep or keep me com vision, nerves, or muscles and in extremely rare instances death. 5. My doctor has explained to me other choices available to me for my care (alternatives).   6. Pregnant Patients (“epidural”):  I understand that the risks of having an epidural (medicine g

## (undated) NOTE — LETTER
Geetha Lyman [address] [address]  [facility], [address] [facility]    Consent for Operation  Date: __________________                                Time: _______________    1.  I authorize the performance upon Morgan Holman the following operation:  Proc procedure has been videotaped, the surgeon will obtain the original videotape. The hospital will not be responsible for storage or maintenance of this tape.   7. For the purpose of advancing medical education, I consent to the admittance of observers to the STATEMENTS REQUIRING INSERTION OR COMPLETION WERE FILLED IN.     Signature of Patient:   ___________________________    When the patient is a minor or mentally incompetent to give consent:  Signature of person authorized to consent for patient: ____________ supplements, and pills I can buy without a prescription (including street drugs/illegal medications). Failure to inform my anesthesiologist about these medicines may increase my risk of anesthetic complications. iv.  If I am allergic to anything or have ha Anesthesiologist Signature     Date   Time  I have discussed the procedure and information above with the patient (or patient’s representative) and answered their questions. The patient or their representative has agreed to have anesthesia services.     ___

## (undated) NOTE — LETTER
[facility] [address], [address]  Consent for Procedure/Sedation    Date:     Time:       1.  I authorize the performance upon Jessica Knox. the following:cardiac catheterization, left ventricular cineangiography, bilateral period, the physician will determine when the applicable recovery period ends for purposes of reinstating the Do Not Resuscitate (DNR) order.     Signature of Patient: ____________________________________________________    Signature of person authorized

## (undated) NOTE — ED AVS SNAPSHOT
[facility] Emergency Department    [facility] [address] [address]    Phone:  [phone]    Fax:  [phone].    MRN: [id number]    Department:  [facility] Emergency Department   Date of Visit: Pediatric [phone] Emergency Department   [phone]       To Check ER Wait Times:  TEXT 'ERwait' to 27685      Click [email or web address] org      Or call [phone]    If you have any problems with your follow-up, please call our case [facility] before you leave. After you leave, you should follow the attached instructions. I have read and understand the instructions given to me by my caregivers. [facility] Pharm[facility]        Pharmacy Address Phone Number   [facility] [address] Final result    Impression:    CONCLUSION:    #1. No acute fractures. #2. Degenerative disc disease. #3. Calcifications within the left upper quadrant are indeterminate.  These could represent calcifications in bowel, left renal calculi, vascular calcif

## (undated) NOTE — LETTER
[facility] [address], [address]  Consent for Procedure/Sedation    Date:        Time:       1.  I authorize the performance upon Donnamaria Meals. the following:cardiac catheterization, left ventricular cineangiography, bilater period, the physician will determine when the applicable recovery period ends for purposes of reinstating the Do Not Resuscitate (DNR) order.     Signature of Patient: ____________________________________________________    Signature of person authorized

## (undated) NOTE — LETTER
[facility] [address], [address]  Consent for Procedure/Sedation    Date:        Time:       1.  I authorize the performance upon Prabhjot Faustin. the following:cardiac catheterization, left ventricular cineangiography, bilater 7. If I have a Do Not Resuscitate (DNR) order in place, the physician and I (or the individual authorized to consent on my behalf) will discuss and agree as to whether the Do Not Resuscitate (DNR) order will remain in effect or will be discontinued during

## (undated) NOTE — LETTER
[address] [facility] FOR BLOOD OR BLOOD COMPONENTS      In the course of your treatment, it may become necessary to administer a transfusion of blood or blood components.  This form provides basic information concerning this proc explain the alternatives to you if it has not already been done. Ebony Hermosillo, have read/had read to me the above. I understand the matters bearing on the decision whether or not to authorize a transfusion of blood or blood components.  I have no

## (undated) NOTE — ED AVS SNAPSHOT
[facility] Emergency Department    [place] [address] [address]    Phone:  [phone]    Fax:  [phone] Daquan Franklin.    MRN: [id number]    Department:  [facility] Emergency Department   Date of Visit: IF THERE IS ANY CHANGE OR WORSENING OF YOUR CONDITION, CALL YOUR PRIMARY CARE PHYSICIAN AT ONCE OR RETURN IMMEDIATELY TO THE EMERGENCY DEPARTMENT.     If you have been prescribed any medication(s), please fill your prescription right away and begin taking t

## (undated) NOTE — LETTER
[facility] [address], [address]  Consent for Procedure/Sedation    Date: 04/21/2019    Time: 16:38 PM      1.  I authorize the performance upon Coby President the following:cardiac catheterization, left ventricular cineangi period, the physician will determine when the applicable recovery period ends for purposes of reinstating the Do Not Resuscitate (DNR) order.     Signature of Patient: ____________________________________________________    Signature of person authorized

## (undated) NOTE — LETTER
Steffanie Barraza M.D., F.A.C.S. Charlie Govea M.D., F.A.C.S. Carly Diana M.D., Jonatan Cali. KERA Cruz M.D., F.A.C.S. Maya Richardson. Elicia Fairbanks M.D., F.A.C.S. Kristal Beltre M.D. OZZY Guzman M.D., F. chance to have all of your questions and concerns answered. If there are any issues which have not been adequately addressed, we ask you to bring them forward so that we can thoroughly address them.     A patient who is fully informed and understands their treatment, among other options and the risks and benefits of the different treatment options:    Yes _____ No _____    A [facility] surgeon as explained to me that if I should so desire, he/she is willing to explain my case and the surgical and non-surgical optio